# Patient Record
Sex: FEMALE | Race: WHITE | NOT HISPANIC OR LATINO | Employment: FULL TIME | URBAN - METROPOLITAN AREA
[De-identification: names, ages, dates, MRNs, and addresses within clinical notes are randomized per-mention and may not be internally consistent; named-entity substitution may affect disease eponyms.]

---

## 2017-01-10 ENCOUNTER — HOSPITAL ENCOUNTER (OUTPATIENT)
Dept: MAMMOGRAPHY | Facility: HOSPITAL | Age: 47
Discharge: HOME | End: 2017-01-10
Attending: OBSTETRICS & GYNECOLOGY | Admitting: OBSTETRICS & GYNECOLOGY

## 2017-01-10 DIAGNOSIS — Z12.31 VISIT FOR SCREENING MAMMOGRAM: ICD-10-CM

## 2017-01-10 PROCEDURE — G0202 SCR MAMMO BI INCL CAD: HCPCS

## 2017-01-10 PROCEDURE — 77063 BREAST TOMOSYNTHESIS BI: CPT | Performed by: RADIOLOGY

## 2017-01-10 PROCEDURE — 77063 BREAST TOMOSYNTHESIS BI: CPT

## 2017-01-10 PROCEDURE — 77067 SCR MAMMO BI INCL CAD: CPT | Performed by: RADIOLOGY

## 2017-01-26 ENCOUNTER — HOSPITAL ENCOUNTER (OUTPATIENT)
Dept: MAMMOGRAPHY | Facility: HOSPITAL | Age: 47
Discharge: HOME | End: 2017-01-26
Admitting: OBSTETRICS & GYNECOLOGY

## 2017-01-26 DIAGNOSIS — R92.8 ABNORMAL MAMMOGRAM: ICD-10-CM

## 2017-01-26 PROCEDURE — 77065 DX MAMMO INCL CAD UNI: CPT | Performed by: RADIOLOGY

## 2017-01-26 PROCEDURE — G0279 TOMOSYNTHESIS, MAMMO: HCPCS

## 2017-01-26 PROCEDURE — 77061 BREAST TOMOSYNTHESIS UNI: CPT | Performed by: RADIOLOGY

## 2017-01-26 PROCEDURE — G0206 DX MAMMO INCL CAD UNI: HCPCS

## 2017-03-17 RX ORDER — OSELTAMIVIR PHOSPHATE 75 MG/1
75 CAPSULE ORAL 2 TIMES DAILY
Qty: 10 CAPSULE | Refills: 0 | Status: SHIPPED | OUTPATIENT
Start: 2017-03-17 | End: 2017-08-07

## 2017-08-07 ENCOUNTER — OFFICE VISIT (OUTPATIENT)
Dept: FAMILY MEDICINE CLINIC | Facility: CLINIC | Age: 47
End: 2017-08-07

## 2017-08-07 VITALS
DIASTOLIC BLOOD PRESSURE: 74 MMHG | WEIGHT: 149 LBS | HEART RATE: 78 BPM | BODY MASS INDEX: 21.33 KG/M2 | TEMPERATURE: 97.7 F | HEIGHT: 70 IN | RESPIRATION RATE: 16 BRPM | SYSTOLIC BLOOD PRESSURE: 124 MMHG

## 2017-08-07 DIAGNOSIS — N30.00 ACUTE CYSTITIS WITHOUT HEMATURIA: Primary | ICD-10-CM

## 2017-08-07 LAB
BILIRUB BLD-MCNC: NEGATIVE MG/DL
GLUCOSE UR STRIP-MCNC: NEGATIVE MG/DL
KETONES UR QL: NEGATIVE
LEUKOCYTE EST, POC: ABNORMAL
NITRITE UR-MCNC: NEGATIVE MG/ML
PH UR: 6 [PH] (ref 5–8)
PROT UR STRIP-MCNC: ABNORMAL MG/DL
RBC # UR STRIP: ABNORMAL /UL
SP GR UR: 1.02 (ref 1–1.03)
UROBILINOGEN UR QL: NORMAL

## 2017-08-07 PROCEDURE — 99213 OFFICE O/P EST LOW 20 MIN: CPT | Performed by: FAMILY MEDICINE

## 2017-08-07 PROCEDURE — 81003 URINALYSIS AUTO W/O SCOPE: CPT | Performed by: FAMILY MEDICINE

## 2017-08-07 RX ORDER — CIPROFLOXACIN 500 MG/1
500 TABLET, FILM COATED ORAL 2 TIMES DAILY
Qty: 6 TABLET | Refills: 0 | Status: SHIPPED | OUTPATIENT
Start: 2017-08-07 | End: 2018-03-06

## 2017-08-07 RX ORDER — PHENAZOPYRIDINE HYDROCHLORIDE 200 MG/1
200 TABLET, FILM COATED ORAL 3 TIMES DAILY PRN
Qty: 10 TABLET | Refills: 0 | Status: SHIPPED | OUTPATIENT
Start: 2017-08-07 | End: 2018-03-06

## 2017-08-07 NOTE — PROGRESS NOTES
Subjective   Joann Nuñez is a 46 y.o. female.     History of Present Illness     Pt complains of painful urination the last several days  No fever, + N no emesis  Started this AM with the N and dysuria  Soreness in her low back  + frequency and urgency      Review of Systems   Constitutional: Negative.    Genitourinary: Positive for dysuria.       Objective   Physical Exam   Constitutional: She appears well-developed and well-nourished. No distress.   Cardiovascular: Normal rate, regular rhythm and normal heart sounds.    Pulmonary/Chest: Effort normal and breath sounds normal.   Abdominal: Soft. Bowel sounds are normal. She exhibits no distension. Tenderness: no CVA TTP but some suprapubic TTP.   Psychiatric: She has a normal mood and affect. Her behavior is normal.   Nursing note and vitals reviewed.      Assessment/Plan   Joann was seen today for difficulty urinating.    Diagnoses and all orders for this visit:    Acute cystitis without hematuria  -     Urine Culture  -     POCT urinalysis dipstick, automated    Other orders  -     ciprofloxacin (CIPRO) 500 MG tablet; Take 1 tablet by mouth 2 (Two) Times a Day.  -     phenazopyridine (PYRIDIUM) 200 MG tablet; Take 1 tablet by mouth 3 (Three) Times a Day As Needed for bladder spasms.    treat with pyridium and ABX, call back INB

## 2017-08-09 LAB
BACTERIA UR CULT: NORMAL
BACTERIA UR CULT: NORMAL

## 2018-01-16 ENCOUNTER — LAB REQUISITION (OUTPATIENT)
Dept: LAB | Facility: HOSPITAL | Age: 48
End: 2018-01-16

## 2018-01-16 DIAGNOSIS — Z00.00 ROUTINE GENERAL MEDICAL EXAMINATION AT A HEALTH CARE FACILITY: ICD-10-CM

## 2018-01-16 PROCEDURE — 36415 COLL VENOUS BLD VENIPUNCTURE: CPT

## 2018-03-06 ENCOUNTER — OFFICE VISIT (OUTPATIENT)
Dept: FAMILY MEDICINE CLINIC | Facility: CLINIC | Age: 48
End: 2018-03-06

## 2018-03-06 VITALS
RESPIRATION RATE: 18 BRPM | BODY MASS INDEX: 23.19 KG/M2 | HEART RATE: 76 BPM | HEIGHT: 70 IN | SYSTOLIC BLOOD PRESSURE: 124 MMHG | WEIGHT: 162 LBS | DIASTOLIC BLOOD PRESSURE: 76 MMHG | TEMPERATURE: 97.4 F

## 2018-03-06 DIAGNOSIS — M70.51 INFRAPATELLAR BURSITIS OF BOTH KNEES: Primary | ICD-10-CM

## 2018-03-06 DIAGNOSIS — M70.52 INFRAPATELLAR BURSITIS OF BOTH KNEES: Primary | ICD-10-CM

## 2018-03-06 PROCEDURE — 99213 OFFICE O/P EST LOW 20 MIN: CPT | Performed by: FAMILY MEDICINE

## 2018-03-06 RX ORDER — NAPROXEN 500 MG/1
500 TABLET ORAL 2 TIMES DAILY WITH MEALS
Qty: 60 TABLET | Refills: 2 | Status: SHIPPED | OUTPATIENT
Start: 2018-03-06 | End: 2018-03-06 | Stop reason: SDUPTHER

## 2018-03-06 RX ORDER — NAPROXEN 500 MG/1
500 TABLET ORAL 2 TIMES DAILY WITH MEALS
Qty: 60 TABLET | Refills: 2 | Status: SHIPPED | OUTPATIENT
Start: 2018-03-06 | End: 2021-03-16

## 2018-03-06 NOTE — PROGRESS NOTES
Subjective   Joann Nuñez is a 47 y.o. female.     History of Present Illness     She has had B knee pain for a while  She has not shaheed working her legs for 4-5 months    About 3 months ago she fell over while being off balance while changing clothes  Fell onto hard surface and had sharp piercing knee pain go through her left knee  Every time her left knee is on the floor, she has this piercing knee pain  Pain was so severe she almost cried this AM  She can walk and run and climb stairs without pain        Review of Systems   Constitutional: Negative.        Objective   Physical Exam   Constitutional: She appears well-developed and well-nourished. No distress.   Cardiovascular: Normal rate, regular rhythm and normal heart sounds.    Pulmonary/Chest: Effort normal and breath sounds normal.   Musculoskeletal:        Legs:  Psychiatric: She has a normal mood and affect. Her behavior is normal.   Nursing note and vitals reviewed.      Assessment/Plan   Joann was seen today for knee pain.    Diagnoses and all orders for this visit:    Infrapatellar bursitis of both knees  -     Discontinue: naproxen (NAPROSYN) 500 MG tablet; Take 1 tablet by mouth 2 (Two) Times a Day With Meals.  -     naproxen (NAPROSYN) 500 MG tablet; Take 1 tablet by mouth 2 (Two) Times a Day With Meals.    I think this is likely bursitis of the knee.  NSAID and ice.  Consider injection in the future INB.  Pt agrees and will RTO INB

## 2019-04-08 ENCOUNTER — OFFICE VISIT (OUTPATIENT)
Dept: FAMILY MEDICINE CLINIC | Facility: CLINIC | Age: 49
End: 2019-04-08

## 2019-04-08 VITALS
HEART RATE: 72 BPM | TEMPERATURE: 97.2 F | SYSTOLIC BLOOD PRESSURE: 110 MMHG | BODY MASS INDEX: 23.34 KG/M2 | WEIGHT: 163 LBS | DIASTOLIC BLOOD PRESSURE: 68 MMHG | HEIGHT: 70 IN | RESPIRATION RATE: 16 BRPM

## 2019-04-08 DIAGNOSIS — R05.9 COUGH: Primary | ICD-10-CM

## 2019-04-08 LAB
EXPIRATION DATE: NORMAL
FLUAV AG NPH QL: NEGATIVE
FLUBV AG NPH QL: NEGATIVE
INTERNAL CONTROL: NORMAL
Lab: NORMAL

## 2019-04-08 PROCEDURE — 99213 OFFICE O/P EST LOW 20 MIN: CPT | Performed by: FAMILY MEDICINE

## 2019-04-08 PROCEDURE — 87804 INFLUENZA ASSAY W/OPTIC: CPT | Performed by: FAMILY MEDICINE

## 2019-04-08 RX ORDER — BROMPHENIRAMINE MALEATE, PSEUDOEPHEDRINE HYDROCHLORIDE, AND DEXTROMETHORPHAN HYDROBROMIDE 2; 30; 10 MG/5ML; MG/5ML; MG/5ML
5 SYRUP ORAL 4 TIMES DAILY PRN
Qty: 180 ML | Refills: 0 | Status: SHIPPED | OUTPATIENT
Start: 2019-04-08 | End: 2020-08-11

## 2019-04-08 RX ORDER — AZITHROMYCIN 250 MG/1
TABLET, FILM COATED ORAL
Qty: 6 TABLET | Refills: 0 | Status: SHIPPED | OUTPATIENT
Start: 2019-04-08 | End: 2020-08-11

## 2019-04-08 NOTE — PROGRESS NOTES
Subjective   Joann Nuñez is a 48 y.o. female.     History of Present Illness     Has been ill the last 5 days  Started with congestion and coughing  Then worse over the weekend  Cough and sinus pressure and congestion getting worse      Review of Systems   HENT: Positive for congestion.    Respiratory: Positive for cough.        Objective   Physical Exam   Constitutional: She appears well-developed and well-nourished.   HENT:   Head: Normocephalic and atraumatic.   Right Ear: Hearing, tympanic membrane, external ear and ear canal normal.   Left Ear: Hearing, tympanic membrane, external ear and ear canal normal.   Nose: Nose normal.   Mouth/Throat: Uvula is midline, oropharynx is clear and moist and mucous membranes are normal.   Eyes: Conjunctivae and EOM are normal.   Neck: Normal range of motion.   Cardiovascular: Normal rate, regular rhythm and normal heart sounds.   Pulmonary/Chest: Effort normal. She has rhonchi (scattered).   Lymphadenopathy:     She has no cervical adenopathy.   Psychiatric: She has a normal mood and affect. Her behavior is normal.   Nursing note and vitals reviewed.      Assessment/Plan   Joann was seen today for uri.    Diagnoses and all orders for this visit:    Cough  -     POC Influenza A / B    Other orders  -     brompheniramine-pseudoephedrine-DM 30-2-10 MG/5ML syrup; Take 5 mL by mouth 4 (Four) Times a Day As Needed for Cough.  -     azithromycin (ZITHROMAX) 250 MG tablet; Take 2 tablets the first day, then 1 tablet daily for 4 days.    flu negative, will treat with zpac as well as bromfed.  Call back INB, pt agrees

## 2019-04-10 ENCOUNTER — TELEPHONE (OUTPATIENT)
Dept: FAMILY MEDICINE CLINIC | Facility: CLINIC | Age: 49
End: 2019-04-10

## 2019-04-10 NOTE — TELEPHONE ENCOUNTER
----- Message from Kenisha Hess sent at 4/10/2019  8:03 AM EDT -----  Contact: DR MOYER  PATIENT IS REQUESTING SOMETHING BE SENT IN TO DRY UP HER CONGESTION TO [facility] IN [place].  [phone]

## 2019-04-25 ENCOUNTER — TELEPHONE (OUTPATIENT)
Dept: FAMILY MEDICINE CLINIC | Facility: CLINIC | Age: 49
End: 2019-04-25

## 2019-04-25 RX ORDER — AZELASTINE 1 MG/ML
2 SPRAY, METERED NASAL 2 TIMES DAILY
Qty: 30 ML | Refills: 0 | Status: SHIPPED | OUTPATIENT
Start: 2019-04-25 | End: 2021-03-16

## 2019-04-25 NOTE — TELEPHONE ENCOUNTER
----- Message from Cherry Gregory Rep sent at 4/25/2019  1:26 PM EDT -----  Contact: COMFORT, PATIENT  PATIENT WAS SEEN 4/8/19 FOR URI SHE IS STILL HAVING LOTS OF DRAINAGE AND A BAD COUGH, CAN WE PRESCRIBE SOMETHING ELSE? SHE HAS TRIED STAHIST AND HAS A COUGH MEDICINE BUT NEITHER HELP, SHE IS ALLERGIC TO CODEINE    ADRIANNE [facility],     [phone] MAY LEAVE A MESSAGE

## 2019-04-25 NOTE — TELEPHONE ENCOUNTER
Will send in astelin nasal spray to help with all the drainage. F/U with Dr. Acosta if still not improving. [facility]

## 2020-01-07 ENCOUNTER — TRANSCRIBE ORDERS (OUTPATIENT)
Dept: ADMINISTRATIVE | Facility: HOSPITAL | Age: 50
End: 2020-01-07

## 2020-01-07 DIAGNOSIS — Z12.31 VISIT FOR SCREENING MAMMOGRAM: Primary | ICD-10-CM

## 2020-01-21 ENCOUNTER — HOSPITAL ENCOUNTER (OUTPATIENT)
Dept: MAMMOGRAPHY | Facility: HOSPITAL | Age: 50
Discharge: HOME | End: 2020-01-21
Admitting: OBSTETRICS & GYNECOLOGY

## 2020-01-21 ENCOUNTER — LAB REQUISITION (OUTPATIENT)
Dept: LAB | Facility: HOSPITAL | Age: 50
End: 2020-01-21

## 2020-01-21 DIAGNOSIS — Z12.31 VISIT FOR SCREENING MAMMOGRAM: ICD-10-CM

## 2020-01-21 DIAGNOSIS — Z00.00 ROUTINE GENERAL MEDICAL EXAMINATION AT A HEALTH CARE FACILITY: ICD-10-CM

## 2020-01-21 PROCEDURE — 77063 BREAST TOMOSYNTHESIS BI: CPT

## 2020-01-21 PROCEDURE — 77067 SCR MAMMO BI INCL CAD: CPT | Performed by: RADIOLOGY

## 2020-01-21 PROCEDURE — 36415 COLL VENOUS BLD VENIPUNCTURE: CPT | Performed by: OBSTETRICS & GYNECOLOGY

## 2020-01-21 PROCEDURE — 77063 BREAST TOMOSYNTHESIS BI: CPT | Performed by: RADIOLOGY

## 2020-01-21 PROCEDURE — 77067 SCR MAMMO BI INCL CAD: CPT

## 2020-08-11 ENCOUNTER — RESULTS ENCOUNTER (OUTPATIENT)
Dept: FAMILY MEDICINE CLINIC | Facility: CLINIC | Age: 50
End: 2020-08-11

## 2020-08-11 ENCOUNTER — OFFICE VISIT (OUTPATIENT)
Dept: FAMILY MEDICINE CLINIC | Facility: CLINIC | Age: 50
End: 2020-08-11

## 2020-08-11 VITALS
BODY MASS INDEX: 23.05 KG/M2 | DIASTOLIC BLOOD PRESSURE: 82 MMHG | HEART RATE: 74 BPM | RESPIRATION RATE: 18 BRPM | TEMPERATURE: 98.7 F | WEIGHT: 161 LBS | SYSTOLIC BLOOD PRESSURE: 118 MMHG | HEIGHT: 70 IN

## 2020-08-11 DIAGNOSIS — Z12.11 SCREEN FOR COLON CANCER: ICD-10-CM

## 2020-08-11 DIAGNOSIS — N92.6 IRREGULAR MENSTRUAL CYCLE: ICD-10-CM

## 2020-08-11 DIAGNOSIS — R53.82 CHRONIC FATIGUE: ICD-10-CM

## 2020-08-11 DIAGNOSIS — Z83.42 FAMILY HISTORY OF HYPERCHOLESTEROLEMIA: ICD-10-CM

## 2020-08-11 DIAGNOSIS — Z83.3 FAMILY HISTORY OF DIABETES MELLITUS: ICD-10-CM

## 2020-08-11 DIAGNOSIS — Z00.00 WELL WOMAN EXAM (NO GYNECOLOGICAL EXAM): Primary | ICD-10-CM

## 2020-08-11 DIAGNOSIS — Z11.59 ENCOUNTER FOR HEPATITIS C SCREENING TEST FOR LOW RISK PATIENT: ICD-10-CM

## 2020-08-11 DIAGNOSIS — R63.5 WEIGHT GAIN: ICD-10-CM

## 2020-08-11 PROCEDURE — 99396 PREV VISIT EST AGE 40-64: CPT | Performed by: FAMILY MEDICINE

## 2020-08-11 NOTE — PROGRESS NOTES
"Subjective     Chief Complaint   Patient presents with   • Annual Exam       Joann Nuñez is a 49 y.o. female who presents for an annual exam. The patient has no complaints today. The patient is sexually active. GYN screening history: last pap: was normal. The patient wears seatbelts: yes. The patient participates in regular exercise: yes, but has not been as active with COVID shutting everything down.  The patient reports that there is not domestic violence in her life.     She saw her gyn in  and had hormones checked as she had gained weight.  She had blood work drawn at that time      History of abnormal Pap smear: no  Family history of uterine or ovarian cancer: no  Family history of colon cancer: no  History of abnormal mammogram: no  Family history of breast cancer: no  Colonoscopy history:   Never had      Menstrual History:  OB History        2    Para   2    Term   2            AB        Living           SAB        TAB        Ectopic        Molar        Multiple        Live Births                     No LMP recorded.         The following portions of the patient's history were reviewed and updated as appropriate:vital signs, allergies, current medications, past family history, past medical history, past social history, past surgical history and problem list    Review of Systems   A comprehensive review of systems was negative.   She is just worried about weight gain  Otherwise feeling well    Objective     /82   Pulse 74   Temp 98.7 °F (37.1 °C)   Resp 18   Ht 177.8 cm (70\")   Wt 73 kg (161 lb)   BMI 23.10 kg/m²       Physical Exam   Constitutional: She is oriented to person, place, and time. She appears well-developed and well-nourished. No distress.   HENT:   Head: Normocephalic and atraumatic.   Right Ear: Tympanic membrane, external ear and ear canal normal.   Left Ear: Tympanic membrane, external ear and ear canal normal.   Nose: Nose normal.   Eyes: Conjunctivae and " EOM are normal.   Neck: Normal range of motion. Neck supple. No thyromegaly present.   Cardiovascular: Normal rate, regular rhythm and normal heart sounds.   No murmur heard.  Pulmonary/Chest: Effort normal and breath sounds normal. No respiratory distress.   Abdominal: Soft. Bowel sounds are normal. She exhibits no distension and no mass. There is no tenderness.   Lymphadenopathy:     She has no cervical adenopathy.   Neurological: She is alert and oriented to person, place, and time.   Skin: Skin is warm and dry.   Psychiatric: She has a normal mood and affect. Her behavior is normal. Judgment and thought content normal.   Nursing note and vitals reviewed.          Assessment/Plan     ASSESSMENT  Healthy female exam.    1. Well woman exam (no gynecological exam)    2. Chronic fatigue    3. Weight gain    4. Family history of hypercholesterolemia    5. Family history of diabetes mellitus    6. Irregular menstrual cycle    7. Encounter for hepatitis C screening test for low risk patient    8. Screen for colon cancer         PLAN  1.   Orders Placed This Encounter   Procedures   • Cologuard - Stool, Per Rectum   • Estrogens, Total   • FSH & LH   • Testosterone   • Progesterone   • Lipid Panel   • Comprehensive Metabolic Panel   • Hepatitis C Antibody   • TSH   • CBC & Differential       2. Medications prescribed this encounter:    No orders of the defined types were placed in this encounter.      3. counseled about well woman care including diet and exercise.  Will check cologuard for colon cancer  Will screen for cholesterol and DM due to fmaily history  Will check labs for irregular cycles and fatigue. F/u pending results        Pasha Acosta MD  8/11/2020

## 2020-08-14 LAB
ALBUMIN SERPL-MCNC: 4.5 G/DL (ref 3.5–5.2)
ALBUMIN/GLOB SERPL: 2.4 G/DL
ALP SERPL-CCNC: 53 U/L (ref 39–117)
ALT SERPL-CCNC: 13 U/L (ref 1–33)
AST SERPL-CCNC: 10 U/L (ref 1–32)
BASOPHILS # BLD AUTO: ABNORMAL 10*3/UL
BASOPHILS # BLD MANUAL: 0.07 10*3/MM3 (ref 0–0.2)
BASOPHILS NFR BLD MANUAL: 1 % (ref 0–1.5)
BILIRUB SERPL-MCNC: 0.5 MG/DL (ref 0–1.2)
BUN SERPL-MCNC: 12 MG/DL (ref 6–20)
BUN/CREAT SERPL: 14 (ref 7–25)
CALCIUM SERPL-MCNC: 9.4 MG/DL (ref 8.6–10.5)
CHLORIDE SERPL-SCNC: 98 MMOL/L (ref 98–107)
CHOLEST SERPL-MCNC: 193 MG/DL (ref 0–200)
CO2 SERPL-SCNC: 27.4 MMOL/L (ref 22–29)
CREAT SERPL-MCNC: 0.86 MG/DL (ref 0.57–1)
DIFFERENTIAL COMMENT: NORMAL
EOSINOPHIL # BLD AUTO: ABNORMAL 10*3/UL
EOSINOPHIL # BLD MANUAL: 0.2 10*3/MM3 (ref 0–0.4)
EOSINOPHIL NFR BLD AUTO: ABNORMAL %
EOSINOPHIL NFR BLD MANUAL: 3 % (ref 0.3–6.2)
ERYTHROCYTE [DISTWIDTH] IN BLOOD BY AUTOMATED COUNT: 12.1 % (ref 12.3–15.4)
ESTROGEN SERPL-MCNC: 308 PG/ML
FSH SERPL-ACNC: 12.4 MIU/ML
GLOBULIN SER CALC-MCNC: 1.9 GM/DL
GLUCOSE SERPL-MCNC: 97 MG/DL (ref 65–99)
HCT VFR BLD AUTO: 42 % (ref 34–46.6)
HCV AB S/CO SERPL IA: <0.1 S/CO RATIO (ref 0–0.9)
HDLC SERPL-MCNC: 42 MG/DL (ref 40–60)
HGB BLD-MCNC: 14.2 G/DL (ref 12–15.9)
LDLC SERPL CALC-MCNC: 111 MG/DL (ref 0–100)
LH SERPL-ACNC: 16.2 MIU/ML
LYMPHOCYTES # BLD AUTO: ABNORMAL 10*3/UL
LYMPHOCYTES # BLD MANUAL: 1.45 10*3/MM3 (ref 0.7–3.1)
LYMPHOCYTES NFR BLD AUTO: ABNORMAL %
LYMPHOCYTES NFR BLD MANUAL: 22.2 % (ref 19.6–45.3)
MCH RBC QN AUTO: 28.9 PG (ref 26.6–33)
MCHC RBC AUTO-ENTMCNC: 33.8 G/DL (ref 31.5–35.7)
MCV RBC AUTO: 85.4 FL (ref 79–97)
MONOCYTES # BLD MANUAL: 0.33 10*3/MM3 (ref 0.1–0.9)
MONOCYTES NFR BLD AUTO: ABNORMAL %
MONOCYTES NFR BLD MANUAL: 5.1 % (ref 5–12)
NEUTROPHILS # BLD MANUAL: 4.47 10*3/MM3 (ref 1.7–7)
NEUTROPHILS NFR BLD AUTO: ABNORMAL %
NEUTROPHILS NFR BLD MANUAL: 68.7 % (ref 42.7–76)
PLATELET # BLD AUTO: 193 10*3/MM3 (ref 140–450)
PLATELET BLD QL SMEAR: NORMAL
POTASSIUM SERPL-SCNC: 4.5 MMOL/L (ref 3.5–5.2)
PROGEST SERPL-MCNC: 0.1 NG/ML
PROT SERPL-MCNC: 6.4 G/DL (ref 6–8.5)
RBC # BLD AUTO: 4.92 10*6/MM3 (ref 3.77–5.28)
RBC MORPH BLD: NORMAL
SODIUM SERPL-SCNC: 136 MMOL/L (ref 136–145)
TESTOST SERPL-MCNC: 10 NG/DL (ref 3–41)
TRIGL SERPL-MCNC: 198 MG/DL (ref 0–150)
TSH SERPL DL<=0.005 MIU/L-ACNC: 2.66 UIU/ML (ref 0.27–4.2)
VLDLC SERPL CALC-MCNC: 39.6 MG/DL
WBC # BLD AUTO: 6.51 10*3/MM3 (ref 3.4–10.8)

## 2020-11-02 ENCOUNTER — TELEPHONE (OUTPATIENT)
Dept: FAMILY MEDICINE CLINIC | Facility: CLINIC | Age: 50
End: 2020-11-02

## 2020-11-02 RX ORDER — AMOXICILLIN 500 MG/1
1000 CAPSULE ORAL 2 TIMES DAILY
Qty: 40 CAPSULE | Refills: 0 | Status: SHIPPED | OUTPATIENT
Start: 2020-11-02 | End: 2021-03-16

## 2020-11-02 RX ORDER — SULFAMETHOXAZOLE AND TRIMETHOPRIM 800; 160 MG/1; MG/1
1 TABLET ORAL 2 TIMES DAILY
Qty: 14 TABLET | Refills: 0 | Status: SHIPPED | OUTPATIENT
Start: 2020-11-02 | End: 2021-03-16

## 2020-11-02 NOTE — TELEPHONE ENCOUNTER
PATIENT CALLED AND STATED THAT THE PROVIDER CALLED HER IN AMOXICILLIN FOR SINUS INFECTION. PATIENT STATED SHE CAN NOT TAKE AMOXICILLIN IT MAKES HER VOMIT.     PLEASE CALL AND ADVISE  [phone] [facility]

## 2020-11-02 NOTE — TELEPHONE ENCOUNTER
Caller: Joann Nuñez    Relationship: Self    Best call back number: [phone]    What medication are you requesting: ANTIBIOTIC    What are your current symptoms: SINUS PRESSURE/COUGH/DRAINAGE/SORE THROAT    How long have you been experiencing symptoms: 3 DAYS    Have you had these symptoms before:    [x] Yes  [] No    Have you been treated for these symptoms before:   [x] Yes  [] No    If a prescription is needed, what is your preferred pharmacy and phone number:      [facility] [address] [place] [address] [phone] [facility] [phone]         Additional notes:

## 2021-03-03 ENCOUNTER — TRANSCRIBE ORDERS (OUTPATIENT)
Dept: ADMINISTRATIVE | Facility: HOSPITAL | Age: 51
End: 2021-03-03

## 2021-03-03 DIAGNOSIS — Z12.31 ENCOUNTER FOR SCREENING MAMMOGRAM FOR MALIGNANT NEOPLASM OF BREAST: Primary | ICD-10-CM

## 2021-03-09 ENCOUNTER — HOSPITAL ENCOUNTER (OUTPATIENT)
Dept: MAMMOGRAPHY | Facility: HOSPITAL | Age: 51
Discharge: HOME | End: 2021-03-09
Admitting: OBSTETRICS & GYNECOLOGY

## 2021-03-09 DIAGNOSIS — Z12.31 ENCOUNTER FOR SCREENING MAMMOGRAM FOR MALIGNANT NEOPLASM OF BREAST: ICD-10-CM

## 2021-03-09 PROCEDURE — 77063 BREAST TOMOSYNTHESIS BI: CPT

## 2021-03-09 PROCEDURE — 77067 SCR MAMMO BI INCL CAD: CPT | Performed by: RADIOLOGY

## 2021-03-09 PROCEDURE — 77067 SCR MAMMO BI INCL CAD: CPT

## 2021-03-09 PROCEDURE — 77063 BREAST TOMOSYNTHESIS BI: CPT | Performed by: RADIOLOGY

## 2021-03-16 ENCOUNTER — OFFICE VISIT (OUTPATIENT)
Dept: OBSTETRICS AND GYNECOLOGY | Facility: CLINIC | Age: 51
End: 2021-03-16

## 2021-03-16 VITALS
DIASTOLIC BLOOD PRESSURE: 80 MMHG | TEMPERATURE: 97.8 F | BODY MASS INDEX: 23.34 KG/M2 | WEIGHT: 163 LBS | SYSTOLIC BLOOD PRESSURE: 120 MMHG | HEIGHT: 70 IN

## 2021-03-16 DIAGNOSIS — N95.1 PERIMENOPAUSAL: ICD-10-CM

## 2021-03-16 DIAGNOSIS — Z00.00 ANNUAL PHYSICAL EXAM: Primary | ICD-10-CM

## 2021-03-16 PROCEDURE — 99386 PREV VISIT NEW AGE 40-64: CPT | Performed by: OBSTETRICS & GYNECOLOGY

## 2021-03-16 NOTE — PROGRESS NOTES
GYN Annual Exam     CC- Here for annual exam.     Subjective     Joann Nuñez is a 50 y.o. female established patient who presents for annual well woman exam. Patient's last menstrual period was 10/01/2020 (approximate).. Her periods are rare, .  .    Partner Status: Marital Status: . New Partners since last visit: no.  Desires STD Screening: no.   Current contraception: none  The patient is happy with her current contraceptive method.  Her last period was 10/2020 and she has no menopausal symptoms. She does complain of daily headaches and is seeing her eye doctor and her chiropractor and that has helped a little.    The patient has any complaints today.    She exercises regularly: yes.  She has concerns about domestic violence: no.      OB History        2    Para   2    Term   2            AB        Living           SAB        TAB        Ectopic        Molar        Multiple        Live Births                    Performs monthly Self-Breast Exam: yes  History of abnormal Pap smear: no  Family history of uterine, colon or ovarian cancer: no  Family history of breast cancer: no  History of abnormal mammogram: no  Exercises Regularly: yes  Feelings of Anxiety or Depression: no  Tobacco Usage?: No   Last pap:2020 negative  Thromboembolic Disease: none    Health Maintenance   Topic Date Due   • Annual Gynecologic Pelvic and Breast Exam  Never done   • COLONOSCOPY  Never done   • TDAP/TD VACCINES (1 - Tdap) Never done   • ZOSTER VACCINE (1 of 2) Never done   • INFLUENZA VACCINE  Never done   • ANNUAL PHYSICAL  2021   • PAP SMEAR  2023   • MAMMOGRAM  2023   • HEPATITIS C SCREENING  Completed   • Pneumococcal Vaccine 0-64  Aged Out   • MENINGOCOCCAL VACCINE  Aged Out       The following portions of the patient's history were reviewed and updated as appropriate: allergies, current medications, past family history, past medical history, past social history, past surgical  "history and problem list.    Past Medical History:   Diagnosis Date   • Allergic        Past Surgical History:   Procedure Laterality Date   •  SECTION      x2       Review of Systems  Review of Systems   All other systems reviewed and are negative.      Objective     /80   Temp 97.8 °F (36.6 °C)   Ht 177.8 cm (70\")   Wt 73.9 kg (163 lb)   LMP 10/01/2020 (Approximate)   Breastfeeding No   BMI 23.39 kg/m²       Physical Exam    Breast: Without masses ,nontender, no skin changes or retractions  Axilla: Normal, no lymphadenopathy  Heart: Regular rate no murmurs rubs or gallops  Lungs: Clear to auscultation, normal breath sounds bilaterally  Abdomen: Soft nontender, no hepatosplenomegaly, no guarding or rebound, no masses  Pelvic exam  External genitalia: Normal introitus and vulva  Vagina: Normal mucosa no bleeding inflammation or discharge  Bladder: Normal position nontender  Urethral meatus and urethra: Normal nontender  Cervix: No lesions, no discharge, bleeding or inflammation  Bimanual: Nontender adnexa clear, no sign of uterine or ovarian enlargement  Anal: No external lesions or hemorrhoids      Assessment     Assessment     Problem List Items Addressed This Visit        Genitourinary and Reproductive     Perimenopausal      Other Visit Diagnoses     Annual physical exam    -  Primary    Relevant Orders    Liquid-based Pap Smear, Screening            Plan     1. Reviewed risks/benefits of hormonal contraception after age 35, including possible increased risk of breast cancer, heart disease, blood clots and strokes.  Patient strongly desires to stay on hormonal contraception.  2. Fibrocystic breast changes - Encouraged decreasing caffeine, supportive bra, low dose vitamin E supplementation.  3. Symptoms of menopausal transition reviewed with patient.   4. Healthy lifestyle changes including diet and exercise reviewed  Preventative health initiatives reviewed    Tien Munoz, " MD  03/16/2021

## 2021-03-17 DIAGNOSIS — Z00.00 ANNUAL PHYSICAL EXAM: ICD-10-CM

## 2021-12-17 ENCOUNTER — TELEPHONE (OUTPATIENT)
Dept: FAMILY MEDICINE CLINIC | Facility: CLINIC | Age: 51
End: 2021-12-17

## 2021-12-17 DIAGNOSIS — R05.9 COUGH: Primary | ICD-10-CM

## 2021-12-17 RX ORDER — BROMPHENIRAMINE MALEATE, PSEUDOEPHEDRINE HYDROCHLORIDE, AND DEXTROMETHORPHAN HYDROBROMIDE 2; 30; 10 MG/5ML; MG/5ML; MG/5ML
5 SYRUP ORAL 4 TIMES DAILY PRN
Qty: 240 ML | Refills: 0 | Status: SHIPPED | OUTPATIENT
Start: 2021-12-17 | End: 2022-02-21

## 2021-12-17 NOTE — TELEPHONE ENCOUNTER
Please call, I can send in some cough med to help but if not getting better, she will need to be seen.

## 2021-12-17 NOTE — TELEPHONE ENCOUNTER
Caller: Joann Nuñez    Relationship: Self    Best call back number: [phone] (H)    What medication are you requesting:   COUGH MEDICATION AND ANTIHISTIMINE    What are your current symptoms:   PERSISTENT COUGH AND SINUS DRAINAGE    How long have you been experiencing symptoms:   SINCE BEFORE THANKSGIVING    Have you had these symptoms before:    [] Yes  [x] No    Have you been treated for these symptoms before:   [] Yes  [x] No    If a prescription is needed, what is your preferred pharmacy and phone number:      [address] [phone] [facility] [phone]     Additional notes:  PATIENT STATES THAT SHE HAS RESIDUAL COUGH AND SINUS DRAINAGE FOR THE SYMPTOMS AFTER COVID, PATIENT STATES THAT SHE WAS RELEASED FROM QUARANTINE ON 12/1/21BUT STILL IS STRUGGLING WITH THE SYMPTOMS LISTED ABOVE. PATIENT IS REQUESTING A MEDICATION THAT CAN AIDE HER WITH THE RESIDUAL SYMPTOMS.     PATIENT HAS BEEN ADVISED TO PLEASE ALLOW 48 HOURS FOR OUR CLINICAL TEAM WILL FOLLOW UP ON THIS REQUEST.

## 2022-02-21 ENCOUNTER — OFFICE VISIT (OUTPATIENT)
Dept: FAMILY MEDICINE CLINIC | Facility: CLINIC | Age: 52
End: 2022-02-21

## 2022-02-21 VITALS
TEMPERATURE: 98.4 F | HEART RATE: 70 BPM | RESPIRATION RATE: 18 BRPM | DIASTOLIC BLOOD PRESSURE: 90 MMHG | BODY MASS INDEX: 23.62 KG/M2 | SYSTOLIC BLOOD PRESSURE: 130 MMHG | HEIGHT: 70 IN | WEIGHT: 165 LBS

## 2022-02-21 DIAGNOSIS — Z12.11 COLON CANCER SCREENING: ICD-10-CM

## 2022-02-21 DIAGNOSIS — M25.562 CHRONIC PAIN OF BOTH KNEES: Primary | ICD-10-CM

## 2022-02-21 DIAGNOSIS — M25.522 BILATERAL ELBOW JOINT PAIN: ICD-10-CM

## 2022-02-21 DIAGNOSIS — M25.561 CHRONIC PAIN OF BOTH KNEES: Primary | ICD-10-CM

## 2022-02-21 DIAGNOSIS — G89.29 CHRONIC PAIN OF BOTH KNEES: Primary | ICD-10-CM

## 2022-02-21 DIAGNOSIS — M25.551 RIGHT HIP PAIN: ICD-10-CM

## 2022-02-21 DIAGNOSIS — M25.521 BILATERAL ELBOW JOINT PAIN: ICD-10-CM

## 2022-02-21 DIAGNOSIS — M25.50 MULTIPLE JOINT PAIN: ICD-10-CM

## 2022-02-21 PROCEDURE — 99214 OFFICE O/P EST MOD 30 MIN: CPT | Performed by: FAMILY MEDICINE

## 2022-02-21 RX ORDER — PREDNISONE 20 MG/1
TABLET ORAL
Qty: 16 TABLET | Refills: 0 | Status: SHIPPED | OUTPATIENT
Start: 2022-02-21 | End: 2022-10-18

## 2022-02-21 NOTE — PROGRESS NOTES
Subjective   Joann Nuñez is a 51 y.o. female.     History of Present Illness     She complains of B knee pain and B elbow pain and right hip pain for the past 6+ months  Hurts to move them all the time  Hard to lean on the table due to elbow pain  Pins and needles pain in her elbows  Tightness in her knees  Right hip pain has been hurting recently as well  Just tight when she walks and hurts to take a step    She has tried naproxen with no significant help    The following portions of the patient's history were reviewed and updated as appropriate: allergies, current medications, past family history, past medical history, past social history, past surgical history and problem list.    Review of Systems   Constitutional: Negative.    Musculoskeletal: Positive for arthralgias.   Psychiatric/Behavioral: Negative.        Objective   Physical Exam  Vitals and nursing note reviewed.   Constitutional:       General: She is not in acute distress.     Appearance: Normal appearance. She is well-developed.   Cardiovascular:      Rate and Rhythm: Normal rate and regular rhythm.      Heart sounds: Normal heart sounds.   Pulmonary:      Effort: Pulmonary effort is normal.      Breath sounds: Normal breath sounds.   Musculoskeletal:        Arms:         Legs:    Neurological:      Mental Status: She is alert and oriented to person, place, and time.   Psychiatric:         Mood and Affect: Mood normal.         Behavior: Behavior normal.         Thought Content: Thought content normal.         Judgment: Judgment normal.         Assessment/Plan   Diagnoses and all orders for this visit:    1. Chronic pain of both knees (Primary)  -     CBC & Differential  -     Comprehensive Metabolic Panel  -     Sedimentation Rate  -     NOA With / DsDNA, RNP, Sjogrens A / B, Smith  -     Rheumatoid Factor  -     Uric Acid  -     predniSONE (DELTASONE) 20 MG tablet; 3 po daily 2 days then 2 po daily 3 days then 1 po daily 3 days then 1/2  po daily 2 days  Dispense: 16 tablet; Refill: 0    2. Bilateral elbow joint pain  -     CBC & Differential  -     Comprehensive Metabolic Panel  -     Sedimentation Rate  -     NOA With / DsDNA, RNP, Sjogrens A / B, Smith  -     Rheumatoid Factor  -     Uric Acid  -     predniSONE (DELTASONE) 20 MG tablet; 3 po daily 2 days then 2 po daily 3 days then 1 po daily 3 days then 1/2 po daily 2 days  Dispense: 16 tablet; Refill: 0    3. Right hip pain  -     CBC & Differential  -     Comprehensive Metabolic Panel  -     Sedimentation Rate  -     NOA With / DsDNA, RNP, Sjogrens A / B, Smith  -     Rheumatoid Factor  -     Uric Acid  -     predniSONE (DELTASONE) 20 MG tablet; 3 po daily 2 days then 2 po daily 3 days then 1 po daily 3 days then 1/2 po daily 2 days  Dispense: 16 tablet; Refill: 0    4. Multiple joint pain  -     CBC & Differential  -     Comprehensive Metabolic Panel  -     Sedimentation Rate  -     NOA With / DsDNA, RNP, Sjogrens A / B, Smith  -     Rheumatoid Factor  -     Uric Acid    5. Colon cancer screening  -     Cologuard - Stool, Per Rectum; Future    will check rheumatologic labs to ensure no insidious cause of multiple joint pains.  Will treat with pred burst.  F/u pending labs, would strongly consider PT.  Consider XR of hip in future.  Pt agrees    cologuard ordered as well

## 2022-02-22 LAB
ALBUMIN SERPL-MCNC: 4.5 G/DL (ref 3.8–4.9)
ALBUMIN/GLOB SERPL: 2 {RATIO} (ref 1.2–2.2)
ALP SERPL-CCNC: 65 IU/L (ref 44–121)
ALT SERPL-CCNC: 28 IU/L (ref 0–32)
ANA SER QL: NEGATIVE
AST SERPL-CCNC: 21 IU/L (ref 0–40)
BASOPHILS # BLD AUTO: 0.1 X10E3/UL (ref 0–0.2)
BASOPHILS NFR BLD AUTO: 1 %
BILIRUB SERPL-MCNC: 0.3 MG/DL (ref 0–1.2)
BUN SERPL-MCNC: 14 MG/DL (ref 6–24)
BUN/CREAT SERPL: 18 (ref 9–23)
CALCIUM SERPL-MCNC: 9.1 MG/DL (ref 8.7–10.2)
CHLORIDE SERPL-SCNC: 104 MMOL/L (ref 96–106)
CO2 SERPL-SCNC: 22 MMOL/L (ref 20–29)
CREAT SERPL-MCNC: 0.77 MG/DL (ref 0.57–1)
EOSINOPHIL # BLD AUTO: 0.2 X10E3/UL (ref 0–0.4)
EOSINOPHIL NFR BLD AUTO: 3 %
ERYTHROCYTE [DISTWIDTH] IN BLOOD BY AUTOMATED COUNT: 12.8 % (ref 11.7–15.4)
ERYTHROCYTE [SEDIMENTATION RATE] IN BLOOD BY WESTERGREN METHOD: 2 MM/HR (ref 0–40)
GLOBULIN SER CALC-MCNC: 2.3 G/DL (ref 1.5–4.5)
GLUCOSE SERPL-MCNC: 98 MG/DL (ref 65–99)
HCT VFR BLD AUTO: 41.7 % (ref 34–46.6)
HGB BLD-MCNC: 14.1 G/DL (ref 11.1–15.9)
IMM GRANULOCYTES # BLD AUTO: 0 X10E3/UL (ref 0–0.1)
IMM GRANULOCYTES NFR BLD AUTO: 0 %
LYMPHOCYTES # BLD AUTO: 1.9 X10E3/UL (ref 0.7–3.1)
LYMPHOCYTES NFR BLD AUTO: 29 %
MCH RBC QN AUTO: 28.5 PG (ref 26.6–33)
MCHC RBC AUTO-ENTMCNC: 33.8 G/DL (ref 31.5–35.7)
MCV RBC AUTO: 84 FL (ref 79–97)
MONOCYTES # BLD AUTO: 0.4 X10E3/UL (ref 0.1–0.9)
MONOCYTES NFR BLD AUTO: 6 %
NEUTROPHILS # BLD AUTO: 3.9 X10E3/UL (ref 1.4–7)
NEUTROPHILS NFR BLD AUTO: 61 %
PLATELET # BLD AUTO: 176 X10E3/UL (ref 150–450)
POTASSIUM SERPL-SCNC: 4 MMOL/L (ref 3.5–5.2)
PROT SERPL-MCNC: 6.8 G/DL (ref 6–8.5)
RBC # BLD AUTO: 4.95 X10E6/UL (ref 3.77–5.28)
RHEUMATOID FACT SERPL-ACNC: <10 IU/ML
SODIUM SERPL-SCNC: 142 MMOL/L (ref 134–144)
URATE SERPL-MCNC: 3.1 MG/DL (ref 3–7.2)
WBC # BLD AUTO: 6.5 X10E3/UL (ref 3.4–10.8)

## 2022-03-16 ENCOUNTER — TELEPHONE (OUTPATIENT)
Dept: FAMILY MEDICINE CLINIC | Facility: CLINIC | Age: 52
End: 2022-03-16

## 2022-03-16 DIAGNOSIS — M25.50 MULTIPLE JOINT PAIN: Primary | ICD-10-CM

## 2022-03-16 RX ORDER — ETODOLAC 400 MG/1
400 TABLET, FILM COATED ORAL 2 TIMES DAILY
Qty: 60 TABLET | Refills: 1 | Status: SHIPPED | OUTPATIENT
Start: 2022-03-16 | End: 2022-10-18

## 2022-03-16 NOTE — TELEPHONE ENCOUNTER
Lets use lodine, a strong NSAID.  Script sent in    Labs were normal and looked great, I reviewed them today

## 2022-03-16 NOTE — TELEPHONE ENCOUNTER
Pt would like call from either dr. ohara or the nurse about the inflammation in her knees and elbows. She was seen recently for this.

## 2022-03-16 NOTE — TELEPHONE ENCOUNTER
Pt was in a few weeks ago and you put her on prednisone and it only helped slightly. Now that she has finished it, the symptoms returned again and worse than before. She has been taking Naproxen but it is not helping. She wants to know if you can send in a stronger NSAID or what you suggest next?

## 2022-10-06 ENCOUNTER — TRANSCRIBE ORDERS (OUTPATIENT)
Dept: ADMINISTRATIVE | Facility: HOSPITAL | Age: 52
End: 2022-10-06

## 2022-10-06 DIAGNOSIS — Z12.31 VISIT FOR SCREENING MAMMOGRAM: Primary | ICD-10-CM

## 2022-10-18 ENCOUNTER — OFFICE VISIT (OUTPATIENT)
Dept: OBSTETRICS AND GYNECOLOGY | Facility: CLINIC | Age: 52
End: 2022-10-18

## 2022-10-18 VITALS
WEIGHT: 170.6 LBS | SYSTOLIC BLOOD PRESSURE: 115 MMHG | BODY MASS INDEX: 24.42 KG/M2 | DIASTOLIC BLOOD PRESSURE: 80 MMHG | HEIGHT: 70 IN

## 2022-10-18 DIAGNOSIS — R23.2 HOT FLASHES: ICD-10-CM

## 2022-10-18 DIAGNOSIS — Z01.419 WOMEN'S ANNUAL ROUTINE GYNECOLOGICAL EXAMINATION: Primary | ICD-10-CM

## 2022-10-18 PROCEDURE — 99396 PREV VISIT EST AGE 40-64: CPT | Performed by: NURSE PRACTITIONER

## 2022-10-18 NOTE — PROGRESS NOTES
Gynecologic Annual Exam Note          GYN Annual Exam     Gynecologic Exam (Annual)        Subjective     HPI  Joann Nuñez is a 51 y.o. female, , who presents for annual well woman exam as a established patient, former Dr. Munoz patient. Patient's last menstrual period was 10/01/2020 (approximate). She reports that she has not a period in over a year.  Partner Status: Marital Status: . She is is sexually active. She has not had new partners.. STD testing recommendations have been explained to the patient and she does not desire STD testing. There were no changes to her medical or surgical history since her last visit.     She states she has been feeling indifferent about several things in her life and that she just doesn't feel good. She reports that she has a constant headache that is always there, she describes it as a constant aching pain. She states that it is often at the back of her head and wraps around her neck and the side of her head, and sometimes it is at the front of her head. She reports she has migraines, about 2 per year. She reports she has been experiencing night sweats, hot flashes, weight gain, joint aches, irritability, and feels like her tolerance level for others is low. She has had labs in 2022 with PCP and all were normal. She denies depression symptoms.       Additional OB/GYN History   Current contraception: contraceptive methods: Post menopausal status  Desires to: do not start contraception    Last Pap : 3/16/2021. Result: negative. HPV: not done  Last Completed Pap Smear          Ordered - PAP SMEAR (Every 3 Years) Ordered on 10/18/2022    2021  Liquid-based Pap Smear, Screening    2020  Done    10/17/2016  Done              History of abnormal Pap smear: no  Family history of uterine, colon, breast, or ovarian cancer: no  Performs monthly Self-Breast Exam: yes  Last mammogram: 2021. Done at , negative/normal. Patient has a mammgram  schedule for 2022.     Last Completed Mammogram          Scheduled - MAMMOGRAM (Every 2 Years) Scheduled for 2022  Mammo Screening Digital Tomosynthesis Bilateral With CAD    2020  Mammo Screening Digital Tomosynthesis Bilateral With CAD    2017  Mammo diagnostic digital tomosynthesis right w CAD    01/10/2017  Mammo Screening Digital Tomosynthesis Bilateral With CAD                History of abnormal mammogram: no    Colonoscopy: has had a colonoscopy several year(s) ago. Patient completed a Cologard 3/3/2022, results were negative.   Exercises Regularly: yes, occasionally  Feelings of Anxiety or Depression: no  Tobacco Usage?: No       Current Outpatient Medications:   •  Multiple Vitamins-Minerals (MULTIVITAMIN ADULT PO), Take 1 tablet by mouth daily., Disp: , Rfl:      Patient denies the need for medication refills today.    OB History        2    Para   2    Term   2            AB        Living           SAB        IAB        Ectopic        Molar        Multiple        Live Births                    Past Medical History:   Diagnosis Date   • Allergic    • Migraine    • Multiple gestation 98        Past Surgical History:   Procedure Laterality Date   •  SECTION      x2       Health Maintenance   Topic Date Due   • Annual Gynecologic Pelvic and Breast Exam  Never done   • TDAP/TD VACCINES (1 - Tdap) Never done   • ZOSTER VACCINE (1 of 2) Never done   • COVID-19 Vaccine (2 - Booster for Yonis series) 2021   • ANNUAL PHYSICAL  2022   • INFLUENZA VACCINE  Never done   • MAMMOGRAM  2023   • PAP SMEAR  2024   • COLORECTAL CANCER SCREENING  2025   • HEPATITIS C SCREENING  Completed   • Pneumococcal Vaccine 0-64  Aged Out       The additional following portions of the patient's history were reviewed and updated as appropriate: allergies, current medications, past family history, past medical history, past social history and  "past surgical history.    Review of Systems   Constitutional: Negative.    Cardiovascular: Negative.    Gastrointestinal: Negative.    Endocrine: Positive for heat intolerance ( hot flashes).   Genitourinary: Negative.         Night sweats, hot flashes     Psychiatric/Behavioral: Negative.          I have reviewed and agree with the HPI, ROS, and historical information as entered above. Jenna Ogden, APRN      Objective   /80   Ht 177.8 cm (70\")   Wt 77.4 kg (170 lb 9.6 oz)   LMP 10/01/2020 (Approximate)   BMI 24.48 kg/m²     Physical Exam  Vitals and nursing note reviewed. Exam conducted with a chaperone present.   Constitutional:       Appearance: She is well-developed.   HENT:      Head: Normocephalic and atraumatic.   Neck:      Thyroid: No thyroid mass or thyromegaly.   Cardiovascular:      Rate and Rhythm: Normal rate and regular rhythm.      Heart sounds: No murmur heard.  Pulmonary:      Effort: Pulmonary effort is normal. No retractions.      Breath sounds: Normal breath sounds. No wheezing, rhonchi or rales.   Chest:      Chest wall: No mass or tenderness.   Breasts:     Right: Normal. No mass, nipple discharge, skin change or tenderness.      Left: Normal. No mass, nipple discharge, skin change or tenderness.   Abdominal:      General: Bowel sounds are normal.      Palpations: Abdomen is soft. Abdomen is not rigid. There is no mass.      Tenderness: There is no abdominal tenderness. There is no guarding.      Hernia: No hernia is present.   Genitourinary:     General: Normal vulva.      Labia:         Right: No rash, tenderness or lesion.         Left: No rash, tenderness or lesion.       Vagina: Normal. No vaginal discharge or lesions.      Cervix: No cervical motion tenderness, discharge, lesion or cervical bleeding.      Uterus: Normal. Not enlarged, not fixed and not tender.       Adnexa: Right adnexa normal and left adnexa normal.        Right: No mass or tenderness.          Left: No " mass or tenderness.        Rectum: Normal. No external hemorrhoid.   Musculoskeletal:      Cervical back: Normal range of motion. No muscular tenderness.   Neurological:      Mental Status: She is alert and oriented to person, place, and time.   Psychiatric:         Behavior: Behavior normal.            Assessment and Plan    Problem List Items Addressed This Visit    None  Visit Diagnoses     Women's annual routine gynecological examination    -  Primary    Relevant Orders    LIQUID-BASED PAP SMEAR, [facility] ([facility])    LIQUID-BASED PAP SMEAR, [facility] ([facility])    Hot flashes              1. GYN annual well woman exam.   2. Has screening mammogram scheduled 11/22/22  3. Will try Estroven OTC for menopausal symptoms  4. Pap guidelines reviewed.  5. Reviewed monthly self breast exams.  Instructed to call with lumps, pain, or breast discharge.    6. Reviewed exercise as a preventative health measures.   7. Reccommended Flu Vaccine in Fall of each year.  8. Symptoms of menopausal transition reviewed with patient.   9. RTC in 1 year or PRN with problems.      Jenna Ogden, APRN  10/18/2022

## 2022-10-21 LAB — REF LAB TEST METHOD: NORMAL

## 2022-11-22 ENCOUNTER — HOSPITAL ENCOUNTER (OUTPATIENT)
Dept: MAMMOGRAPHY | Facility: HOSPITAL | Age: 52
Discharge: HOME | End: 2022-11-22
Admitting: FAMILY MEDICINE

## 2022-11-22 DIAGNOSIS — Z12.31 VISIT FOR SCREENING MAMMOGRAM: ICD-10-CM

## 2022-11-22 PROCEDURE — 77063 BREAST TOMOSYNTHESIS BI: CPT

## 2022-11-22 PROCEDURE — 77063 BREAST TOMOSYNTHESIS BI: CPT | Performed by: RADIOLOGY

## 2022-11-22 PROCEDURE — 77067 SCR MAMMO BI INCL CAD: CPT | Performed by: RADIOLOGY

## 2022-11-22 PROCEDURE — 77067 SCR MAMMO BI INCL CAD: CPT

## 2022-12-06 ENCOUNTER — TELEPHONE (OUTPATIENT)
Dept: FAMILY MEDICINE CLINIC | Facility: CLINIC | Age: 52
End: 2022-12-06

## 2022-12-06 RX ORDER — ETODOLAC 400 MG/1
400 TABLET, FILM COATED ORAL 2 TIMES DAILY
Qty: 60 TABLET | Refills: 2 | Status: SHIPPED | OUTPATIENT
Start: 2022-12-06 | End: 2022-12-19 | Stop reason: SDUPTHER

## 2022-12-06 NOTE — TELEPHONE ENCOUNTER
Can you refill medication requested. She should have had one refill at [facility] however they say non avail and she wants changed to [facility].

## 2022-12-06 NOTE — TELEPHONE ENCOUNTER
Caller: NIKKO VALDIVIA    Relationship: SELF    Best call back number: [phone]    What is the best time to reach you: ANYTIME    Who are you requesting to speak with (clinical staff, provider,  specific staff member): PROVIDER OR CLINICAL STAFF    What was the call regarding: [employer] INSURANCE    Do you require a callback: YES

## 2022-12-06 NOTE — TELEPHONE ENCOUNTER
I advised pt to call back and speak with Radha concerning [employer] ins issue. She had an appt in jan for a physical but wanted to see if she could be worked in by the end of December before we no longer accept her insurance. Also she needs a refill on etodolac to [facility].

## 2022-12-06 NOTE — TELEPHONE ENCOUNTER
We just got an email saying this issue will be fixed in future and to reassure pts we can see them in [place]

## 2022-12-09 ENCOUNTER — TELEPHONE (OUTPATIENT)
Dept: FAMILY MEDICINE CLINIC | Facility: CLINIC | Age: 52
End: 2022-12-09

## 2022-12-09 NOTE — TELEPHONE ENCOUNTER
Caller: Joann Nuñez    Relationship to patient: Self    Best call back number: [phone]    Type of visit: PHYSICAL    Requested date: BEFORE THE END OF THE YEAR    Additional notes:      PATIENT WAS SPEAKING WITH JOANN REGARDING ISSUES SHE WAS GOING TO TALK TO DR MOYER ABOUT GETTING PATIENT IN FOR A PHYSICAL BEFORE THE END OF THE YEAR    PATIENT HAS NOT HEARD ANYTHING

## 2022-12-19 ENCOUNTER — OFFICE VISIT (OUTPATIENT)
Dept: FAMILY MEDICINE CLINIC | Facility: CLINIC | Age: 52
End: 2022-12-19

## 2022-12-19 ENCOUNTER — HOSPITAL ENCOUNTER (OUTPATIENT)
Dept: GENERAL RADIOLOGY | Facility: HOSPITAL | Age: 52
Discharge: HOME | End: 2022-12-19
Admitting: FAMILY MEDICINE

## 2022-12-19 VITALS
TEMPERATURE: 97.3 F | DIASTOLIC BLOOD PRESSURE: 78 MMHG | WEIGHT: 170 LBS | HEIGHT: 70 IN | RESPIRATION RATE: 18 BRPM | HEART RATE: 84 BPM | SYSTOLIC BLOOD PRESSURE: 124 MMHG | BODY MASS INDEX: 24.34 KG/M2

## 2022-12-19 DIAGNOSIS — G89.29 CHRONIC HIP PAIN, RIGHT: ICD-10-CM

## 2022-12-19 DIAGNOSIS — M25.551 CHRONIC HIP PAIN, RIGHT: ICD-10-CM

## 2022-12-19 DIAGNOSIS — G44.209 TENSION HEADACHE: ICD-10-CM

## 2022-12-19 DIAGNOSIS — Z00.00 WELL WOMAN EXAM (NO GYNECOLOGICAL EXAM): Primary | ICD-10-CM

## 2022-12-19 DIAGNOSIS — Z83.42 FAMILY HISTORY OF HYPERCHOLESTEROLEMIA: ICD-10-CM

## 2022-12-19 DIAGNOSIS — R23.2 HOT FLASHES: ICD-10-CM

## 2022-12-19 DIAGNOSIS — G43.009 MIGRAINE WITHOUT AURA AND WITHOUT STATUS MIGRAINOSUS, NOT INTRACTABLE: ICD-10-CM

## 2022-12-19 PROCEDURE — 99396 PREV VISIT EST AGE 40-64: CPT | Performed by: FAMILY MEDICINE

## 2022-12-19 PROCEDURE — 73502 X-RAY EXAM HIP UNI 2-3 VIEWS: CPT

## 2022-12-19 RX ORDER — SUMATRIPTAN 100 MG/1
TABLET, FILM COATED ORAL
Qty: 9 TABLET | Refills: 5 | Status: SHIPPED | OUTPATIENT
Start: 2022-12-19

## 2022-12-19 RX ORDER — ETODOLAC 400 MG/1
400 TABLET, FILM COATED ORAL 2 TIMES DAILY
Qty: 60 TABLET | Refills: 2 | Status: SHIPPED | OUTPATIENT
Start: 2022-12-19

## 2022-12-19 RX ORDER — CYCLOBENZAPRINE HCL 10 MG
TABLET ORAL
Qty: 30 TABLET | Refills: 2 | Status: SHIPPED | OUTPATIENT
Start: 2022-12-19

## 2022-12-19 NOTE — PROGRESS NOTES
"Subjective     Chief Complaint   Patient presents with   • Annual Exam       Joann Nuñez is a 52 y.o. female who presents for an annual exam. The patient has no complaints today. The patient is sexually active. GYN screening history: last pap: approximate date  and was normal. The patient wears seatbelts: yes. The patient participates in regular exercise: yes. Has the patient ever been transfused or tattooed?: no. The patient reports that there is not domestic violence in her life.     History of abnormal Pap smear: no  Family history of uterine or ovarian cancer: no  Family history of colon cancer: no  History of abnormal mammogram: no  Family history of breast cancer: no  Colonoscopy history:   3/22 had cologuard      She has a mild HA around her head  Like a band that goes around her head  Not intense nor debilitating pain but is annoying  Timing is very sporadic  Nothing in particular makes it better nor worse  Migraines a little worse and these are intense HA that make her stop what she is doing        She continues to have lingering R hip pain  NSAIDs do help   However the pain is keeping her up at night            Menstrual History:  OB History        2    Para   2    Term   2            AB        Living           SAB        IAB        Ectopic        Molar        Multiple        Live Births                   Patient's last menstrual period was 10/01/2020 (approximate).         The following portions of the patient's history were reviewed and updated as appropriate:vital signs, allergies, current medications, past family history, past medical history, past social history, past surgical history and problem list    Review of Systems   Pertinent items are noted in HPI.     Objective     /78   Pulse 84   Temp 97.3 °F (36.3 °C)   Resp 18   Ht 177.8 cm (70\")   Wt 77.1 kg (170 lb)   LMP 10/01/2020 (Approximate)   BMI 24.39 kg/m²       Physical Exam  Vitals and nursing " note reviewed.   Constitutional:       General: She is not in acute distress.     Appearance: Normal appearance. She is well-developed.   HENT:      Head: Normocephalic and atraumatic.      Right Ear: Tympanic membrane, ear canal and external ear normal.      Left Ear: Tympanic membrane, ear canal and external ear normal.      Nose: Nose normal.   Eyes:      Extraocular Movements: Extraocular movements intact.      Conjunctiva/sclera: Conjunctivae normal.   Neck:      Thyroid: No thyromegaly.   Cardiovascular:      Rate and Rhythm: Normal rate and regular rhythm.      Heart sounds: Normal heart sounds. No murmur heard.  Pulmonary:      Effort: Pulmonary effort is normal. No respiratory distress.      Breath sounds: Normal breath sounds.   Abdominal:      General: Bowel sounds are normal. There is no distension.      Palpations: Abdomen is soft.      Tenderness: There is no abdominal tenderness.   Musculoskeletal:      Cervical back: Normal range of motion and neck supple.   Lymphadenopathy:      Cervical: No cervical adenopathy.   Skin:     General: Skin is warm and dry.   Neurological:      Mental Status: She is alert and oriented to person, place, and time.   Psychiatric:         Mood and Affect: Mood normal.         Behavior: Behavior normal.         Thought Content: Thought content normal.         Judgment: Judgment normal.             Assessment & Plan     ASSESSMENT  Healthy female exam.    1. Well woman exam (no gynecological exam)    2. Migraine without aura and without status migrainosus, not intractable    3. Chronic hip pain, right    4. Hot flashes    5. Tension headache    6. Family history of hypercholesterolemia         PLAN  1.   Orders Placed This Encounter   Procedures   • XR Hip With or Without Pelvis 2 - 3 View Right   • Comprehensive Metabolic Panel   • Lipid Panel   • Estrogens, Total   • FSH & LH   • Progesterone   • TSH   • Ambulatory Referral to Orthopedic Surgery   • CBC & Differential        2. Medications prescribed this encounter:      New Medications Ordered This Visit   Medications   • SUMAtriptan (Imitrex) 100 MG tablet     Sig: Take one tablet at onset of headache. May repeat dose one time in 2 hours if headache not relieved.     Dispense:  9 tablet     Refill:  5   • cyclobenzaprine (FLEXERIL) 10 MG tablet     Si PO QHS     Dispense:  30 tablet     Refill:  2   • etodolac (LODINE) 400 MG tablet     Sig: Take 1 tablet by mouth 2 (Two) Times a Day.     Dispense:  60 tablet     Refill:  2       3. XR hip and ortho for evaluation since this is causing night pain disturbing sleep    Ok PRN flexeril and lodine for pain    Will use imitrex PRN for migraines.    Pasha Acosta MD  2022

## 2022-12-28 ENCOUNTER — NURSE TRIAGE (OUTPATIENT)
Dept: CALL CENTER | Facility: HOSPITAL | Age: 52
End: 2022-12-28

## 2022-12-28 ENCOUNTER — TELEPHONE (OUTPATIENT)
Dept: FAMILY MEDICINE CLINIC | Facility: CLINIC | Age: 52
End: 2022-12-28

## 2022-12-29 DIAGNOSIS — E87.5 POTASSIUM SERUM INCREASED: Primary | ICD-10-CM

## 2022-12-29 DIAGNOSIS — E87.5 HYPERKALEMIA: Primary | ICD-10-CM

## 2022-12-29 LAB
ALBUMIN SERPL-MCNC: 4.1 G/DL (ref 3.5–5.2)
ALBUMIN/GLOB SERPL: 1.7 G/DL
ALP SERPL-CCNC: 78 U/L (ref 39–117)
ALT SERPL-CCNC: 35 U/L (ref 1–33)
AST SERPL-CCNC: 21 U/L (ref 1–32)
BASOPHILS # BLD AUTO: 0.06 10*3/MM3 (ref 0–0.2)
BASOPHILS NFR BLD AUTO: 1 % (ref 0–1.5)
BILIRUB SERPL-MCNC: 0.3 MG/DL (ref 0–1.2)
BUN SERPL-MCNC: 15 MG/DL (ref 6–20)
BUN SERPL-MCNC: 16 MG/DL (ref 6–20)
BUN/CREAT SERPL: 16 (ref 7–25)
BUN/CREAT SERPL: 19 (ref 7–25)
CALCIUM SERPL-MCNC: 9.2 MG/DL (ref 8.6–10.5)
CHLORIDE SERPL-SCNC: 105 MMOL/L (ref 98–107)
CHLORIDE SERPL-SCNC: 107 MMOL/L (ref 98–107)
CHOLEST SERPL-MCNC: 180 MG/DL (ref 0–200)
CO2 SERPL-SCNC: 24.7 MMOL/L (ref 22–29)
CO2 SERPL-SCNC: 24.8 MMOL/L (ref 22–29)
CREAT SERPL-MCNC: 0.84 MG/DL (ref 0.57–1)
CREAT SERPL-MCNC: 0.94 MG/DL (ref 0.57–1)
EGFRCR SERPLBLD CKD-EPI 2021: 72.7 ML/MIN/1.73
EGFRCR SERPLBLD CKD-EPI 2021: 83.2 ML/MIN/1.73
EOSINOPHIL # BLD AUTO: 0.18 10*3/MM3 (ref 0–0.4)
EOSINOPHIL NFR BLD AUTO: 2.9 % (ref 0.3–6.2)
ERYTHROCYTE [DISTWIDTH] IN BLOOD BY AUTOMATED COUNT: 13 % (ref 12.3–15.4)
ESTROGEN SERPL-MCNC: 74 PG/ML
FSH SERPL-ACNC: 94.2 MIU/ML
GLOBULIN SER CALC-MCNC: 2.4 GM/DL
GLUCOSE SERPL-MCNC: 68 MG/DL (ref 65–99)
GLUCOSE SERPL-MCNC: 83 MG/DL (ref 65–99)
HCT VFR BLD AUTO: 42.2 % (ref 34–46.6)
HDLC SERPL-MCNC: 44 MG/DL (ref 40–60)
HGB BLD-MCNC: 14 G/DL (ref 12–15.9)
IMM GRANULOCYTES # BLD AUTO: 0.01 10*3/MM3 (ref 0–0.05)
IMM GRANULOCYTES NFR BLD AUTO: 0.2 % (ref 0–0.5)
LDLC SERPL CALC-MCNC: 103 MG/DL (ref 0–100)
LH SERPL-ACNC: 74.3 MIU/ML
LYMPHOCYTES # BLD AUTO: 1.56 10*3/MM3 (ref 0.7–3.1)
LYMPHOCYTES NFR BLD AUTO: 24.8 % (ref 19.6–45.3)
MCH RBC QN AUTO: 28.3 PG (ref 26.6–33)
MCHC RBC AUTO-ENTMCNC: 33.2 G/DL (ref 31.5–35.7)
MCV RBC AUTO: 85.4 FL (ref 79–97)
MONOCYTES # BLD AUTO: 0.45 10*3/MM3 (ref 0.1–0.9)
MONOCYTES NFR BLD AUTO: 7.2 % (ref 5–12)
NEUTROPHILS # BLD AUTO: 4.03 10*3/MM3 (ref 1.7–7)
NEUTROPHILS NFR BLD AUTO: 63.9 % (ref 42.7–76)
PLATELET # BLD AUTO: 179 10*3/MM3 (ref 140–450)
POTASSIUM SERPL-SCNC: 4.2 MMOL/L (ref 3.5–5.2)
POTASSIUM SERPL-SCNC: 6.8 MMOL/L (ref 3.5–5.2)
PROGEST SERPL-MCNC: <0.1 NG/ML
PROT SERPL-MCNC: 6.5 G/DL (ref 6–8.5)
RBC # BLD AUTO: 4.94 10*6/MM3 (ref 3.77–5.28)
SODIUM SERPL-SCNC: 140 MMOL/L (ref 136–145)
SODIUM SERPL-SCNC: 142 MMOL/L (ref 136–145)
TRIGL SERPL-MCNC: 189 MG/DL (ref 0–150)
TSH SERPL DL<=0.005 MIU/L-ACNC: 3.15 UIU/ML (ref 0.27–4.2)
VLDLC SERPL CALC-MCNC: 33 MG/DL (ref 5–40)
WBC # BLD AUTO: 6.29 10*3/MM3 (ref 3.4–10.8)

## 2022-12-29 NOTE — TELEPHONE ENCOUNTER
Pt aware and understood. Expressed the importance of getting this done today. Stated she'll be here before 12p (that's when [facility] picks up)

## 2022-12-29 NOTE — TELEPHONE ENCOUNTER
On call nurse called with critical lab value, Potassium 6.8. Lab drawn yesterday, but not processed until today due to lab downtime. This has likely affected this result. (Similar situation with 2 other lab samples from other patients).   Please contact patient, as lab needs to recollected.     I did speak with her and she denied having any symptoms. She is aware to go to ED if she develops chest pain, palpitations, or any other new symptoms.

## 2022-12-29 NOTE — TELEPHONE ENCOUNTER
"Critical Lab value called K+ 6.8 drawn yesterday, not ran until today due to lab downtime, may have effected results.  Triager spoke with Dr. Sommers physician on-call notified of critical lab value K+ 6.8 lab drawn yesterday ran today.       Reason for Disposition  • Lab or radiology calling with CRITICAL test results    Additional Information  • Negative: Lab calling with strep throat test results and triager can call in prescription  • Negative: Lab calling with urinalysis test results and triager can call in prescription  • Negative: Medication questions  • Negative: ED call to PCP  • Negative: Physician call to PCP  • Negative: Call about patient who is currently hospitalized  • Negative: [1] Prescription not at pharmacy AND [2] was prescribed today by PCP  • Negative: [1] Follow-up call from patient regarding patient's clinical status AND [2] information urgent  • Negative: [1] Caller requests to speak ONLY to PCP AND [2] URGENT question  • Negative: [1] Caller requests to speak to PCP now AND [2] won't tell us reason for call  (Exception: if 10 pm to 6 am, caller must first discuss reason for the call)  • Negative: Notification of hospital admission  • Negative: Notification of death  • Negative: Caller requesting lab results    Answer Assessment - Initial Assessment Questions  1. REASON FOR CALL or QUESTION: \"What is your reason for calling today?\" or \"How can I best  help you?\" or \"What question do you have that I can help answer?\"      Critical lab value K+ 6.8  2. CALLER: Document the source of call. (e.g., laboratory, patient).  Lab    Protocols used: PCP CALL - NO TRIAGE-ADULT-      "

## 2022-12-30 ENCOUNTER — TELEPHONE (OUTPATIENT)
Dept: FAMILY MEDICINE CLINIC | Facility: CLINIC | Age: 52
End: 2022-12-30

## 2022-12-30 NOTE — TELEPHONE ENCOUNTER
Caller: Joann Nuñez    Relationship: Self    Best call back number: [phone]    What is the best time to reach you: ANY    Who are you requesting to speak with (clinical staff, provider,  specific staff member): CLINICAL    What was the call regarding: [employer]    Do you require a callback: YES    ADVISED PATIENT AS OF 1/1/23 [facility] DOES NOT ACCEPT [employer].  PATIENT HAD MORE QUESTIONS        normal (ped)...

## 2022-12-30 NOTE — TELEPHONE ENCOUNTER
Caller: Joann Nuñez    Relationship: Self    Best call back number: [phone]    What test was performed: LABS    When was the test performed: 12/27/22    Additional notes:     PLEASE CALL TO GIVE LAB RESULTS

## 2023-11-30 ENCOUNTER — TELEPHONE (OUTPATIENT)
Dept: FAMILY MEDICINE CLINIC | Facility: CLINIC | Age: 53
End: 2023-11-30
Payer: COMMERCIAL

## 2023-11-30 NOTE — TELEPHONE ENCOUNTER
Caller: Joann Nuñez    Relationship to patient: Self    Best call back number: [phone]     Chief complaint: PHYSICAL     Type of visit: PHYSICAL     Requested date: SOMETIME BEFORE THE END OF 2023     If rescheduling, when is the original appointment: 12/19/23, PATIENT STATES SOMETHING ELSE HAS COME UP THIS DAY AND SHE CANNOT MAKE THE APPOINTMENT.      Additional notes: PATIENT WOULD LIKE A CALLBACK TO CHECK IF A DIFFERENT DAY FOR HER PHYSICAL CAN BE SCHEDULED DUE TO NO OTHER APPOINTMENTS BEING AVAILABLE IN THE NEAR FUTURE ON PCP'S SCHEDULE.

## 2023-12-26 ENCOUNTER — OFFICE VISIT (OUTPATIENT)
Dept: FAMILY MEDICINE CLINIC | Facility: CLINIC | Age: 53
End: 2023-12-26
Payer: COMMERCIAL

## 2023-12-26 VITALS
HEART RATE: 72 BPM | SYSTOLIC BLOOD PRESSURE: 124 MMHG | TEMPERATURE: 97.1 F | HEIGHT: 70 IN | OXYGEN SATURATION: 98 % | DIASTOLIC BLOOD PRESSURE: 74 MMHG | BODY MASS INDEX: 23.82 KG/M2 | WEIGHT: 166.4 LBS

## 2023-12-26 DIAGNOSIS — R53.82 CHRONIC FATIGUE: ICD-10-CM

## 2023-12-26 DIAGNOSIS — Z00.00 WELL WOMAN EXAM (NO GYNECOLOGICAL EXAM): Primary | ICD-10-CM

## 2023-12-26 DIAGNOSIS — G43.009 MIGRAINE WITHOUT AURA AND WITHOUT STATUS MIGRAINOSUS, NOT INTRACTABLE: ICD-10-CM

## 2023-12-26 PROCEDURE — 99396 PREV VISIT EST AGE 40-64: CPT | Performed by: FAMILY MEDICINE

## 2023-12-26 RX ORDER — SUMATRIPTAN 100 MG/1
TABLET, FILM COATED ORAL
Qty: 9 TABLET | Refills: 5 | Status: SHIPPED | OUTPATIENT
Start: 2023-12-26

## 2023-12-26 NOTE — PROGRESS NOTES
"Subjective     Chief Complaint   Patient presents with    Annual Exam       Joann Nuñez is a 53 y.o. female who presents for an annual exam. The patient has no complaints today. The patient is sexually active. GYN screening history: last pap: approximate date 10/2022 and was normal. The patient wears seatbelts: yes. The patient participates in regular exercise: yes.    The patient reports that there is not domestic violence in her life.     History of abnormal Pap smear: no  Family history of uterine or ovarian cancer: no  Family history of colon cancer: no  History of abnormal mammogram: no  Family history of breast cancer: no  Colonoscopy history:   cologuard       Menstrual History:  OB History          2    Para   2    Term   2            AB        Living             SAB        IAB        Ectopic        Molar        Multiple        Live Births                     Patient's last menstrual period was 10/01/2020 (approximate).         The following portions of the patient's history were reviewed and updated as appropriate:vital signs, allergies, current medications, past family history, past medical history, past social history, past surgical history, and problem list    Review of Systems   A comprehensive review of systems was negative.     Objective     /74   Pulse 72   Temp 97.1 °F (36.2 °C) (Infrared)   Ht 177.8 cm (70\")   Wt 75.5 kg (166 lb 6.4 oz)   LMP 10/01/2020 (Approximate)   SpO2 98%   BMI 23.88 kg/m²       Physical Exam  Vitals and nursing note reviewed.   Constitutional:       General: She is not in acute distress.     Appearance: Normal appearance. She is well-developed.   HENT:      Head: Normocephalic and atraumatic.      Right Ear: Tympanic membrane, ear canal and external ear normal.      Left Ear: Tympanic membrane, ear canal and external ear normal.      Nose: Nose normal.   Eyes:      Extraocular Movements: Extraocular movements intact.      " Conjunctiva/sclera: Conjunctivae normal.   Neck:      Thyroid: No thyromegaly.   Cardiovascular:      Rate and Rhythm: Normal rate and regular rhythm.      Heart sounds: Normal heart sounds. No murmur heard.  Pulmonary:      Effort: Pulmonary effort is normal. No respiratory distress.      Breath sounds: Normal breath sounds.   Abdominal:      General: Bowel sounds are normal. There is no distension.      Palpations: Abdomen is soft.      Tenderness: There is no abdominal tenderness.   Musculoskeletal:      Cervical back: Normal range of motion and neck supple.   Lymphadenopathy:      Cervical: No cervical adenopathy.   Skin:     General: Skin is warm and dry.   Neurological:      Mental Status: She is alert and oriented to person, place, and time.   Psychiatric:         Mood and Affect: Mood normal.         Behavior: Behavior normal.         Thought Content: Thought content normal.         Judgment: Judgment normal.             Assessment & Plan     ASSESSMENT  Healthy female exam.    1. Well woman exam (no gynecological exam)    2. Chronic fatigue    3. Migraine without aura and without status migrainosus, not intractable         PLAN  1.   Orders Placed This Encounter   Procedures    TSH+Free T4    Comprehensive Metabolic Panel    FSH & LH    Estrogens, Total    Progesterone    CBC & Differential       2.  Counseled pt about well adult care.  She is worried about her weight but is 4 pounds lighter than last year!    Will recheck her hormone and thyroid levels and f/u pending labs    3.  Migraines doing well and have been better since seeing a chiropractor.  Ok PRN imitrex    Pasha Acosta MD  12/26/2023

## 2023-12-30 LAB
ALBUMIN SERPL-MCNC: 4.2 G/DL (ref 3.8–4.9)
ALBUMIN/GLOB SERPL: 1.7 {RATIO} (ref 1.2–2.2)
ALP SERPL-CCNC: 74 IU/L (ref 44–121)
ALT SERPL-CCNC: 27 IU/L (ref 0–32)
AST SERPL-CCNC: 16 IU/L (ref 0–40)
BASOPHILS # BLD AUTO: 0.1 X10E3/UL (ref 0–0.2)
BASOPHILS NFR BLD AUTO: 1 %
BILIRUB SERPL-MCNC: 0.4 MG/DL (ref 0–1.2)
BUN SERPL-MCNC: 14 MG/DL (ref 6–24)
BUN/CREAT SERPL: 14 (ref 9–23)
CALCIUM SERPL-MCNC: 9.4 MG/DL (ref 8.7–10.2)
CHLORIDE SERPL-SCNC: 104 MMOL/L (ref 96–106)
CO2 SERPL-SCNC: 24 MMOL/L (ref 20–29)
CREAT SERPL-MCNC: 1 MG/DL (ref 0.57–1)
EGFRCR SERPLBLD CKD-EPI 2021: 67 ML/MIN/1.73
EOSINOPHIL # BLD AUTO: 0.2 X10E3/UL (ref 0–0.4)
EOSINOPHIL NFR BLD AUTO: 3 %
ERYTHROCYTE [DISTWIDTH] IN BLOOD BY AUTOMATED COUNT: 12.5 % (ref 11.7–15.4)
ESTROGEN SERPL-MCNC: 25 PG/ML
FSH SERPL-ACNC: 118 MIU/ML
GLOBULIN SER CALC-MCNC: 2.5 G/DL (ref 1.5–4.5)
GLUCOSE SERPL-MCNC: 98 MG/DL (ref 70–99)
HCT VFR BLD AUTO: 42.8 % (ref 34–46.6)
HGB BLD-MCNC: 14.4 G/DL (ref 11.1–15.9)
IMM GRANULOCYTES # BLD AUTO: 0 X10E3/UL (ref 0–0.1)
IMM GRANULOCYTES NFR BLD AUTO: 0 %
LH SERPL-ACNC: 82 MIU/ML
LYMPHOCYTES # BLD AUTO: 1.7 X10E3/UL (ref 0.7–3.1)
LYMPHOCYTES NFR BLD AUTO: 30 %
MCH RBC QN AUTO: 28.1 PG (ref 26.6–33)
MCHC RBC AUTO-ENTMCNC: 33.6 G/DL (ref 31.5–35.7)
MCV RBC AUTO: 84 FL (ref 79–97)
MONOCYTES # BLD AUTO: 0.4 X10E3/UL (ref 0.1–0.9)
MONOCYTES NFR BLD AUTO: 7 %
NEUTROPHILS # BLD AUTO: 3.4 X10E3/UL (ref 1.4–7)
NEUTROPHILS NFR BLD AUTO: 59 %
PLATELET # BLD AUTO: 182 X10E3/UL (ref 150–450)
POTASSIUM SERPL-SCNC: 4.4 MMOL/L (ref 3.5–5.2)
PROGEST SERPL-MCNC: 0.2 NG/ML
PROT SERPL-MCNC: 6.7 G/DL (ref 6–8.5)
RBC # BLD AUTO: 5.12 X10E6/UL (ref 3.77–5.28)
SODIUM SERPL-SCNC: 140 MMOL/L (ref 134–144)
T4 FREE SERPL-MCNC: 1.17 NG/DL (ref 0.82–1.77)
TSH SERPL DL<=0.005 MIU/L-ACNC: 2.3 UIU/ML (ref 0.45–4.5)
WBC # BLD AUTO: 5.8 X10E3/UL (ref 3.4–10.8)

## 2024-06-24 DIAGNOSIS — G43.009 MIGRAINE WITHOUT AURA AND WITHOUT STATUS MIGRAINOSUS, NOT INTRACTABLE: ICD-10-CM

## 2024-06-24 RX ORDER — SUMATRIPTAN 100 MG/1
TABLET, FILM COATED ORAL
Qty: 9 TABLET | Refills: 2 | Status: SHIPPED | OUTPATIENT
Start: 2024-06-24

## 2024-06-24 NOTE — TELEPHONE ENCOUNTER
Caller: Joann Nuñez    Relationship: Self    Best call back number: [phone]     Requested Prescriptions:   Requested Prescriptions     Pending Prescriptions Disp Refills    SUMAtriptan (Imitrex) 100 MG tablet 9 tablet 5     Sig: Take one tablet at onset of headache. May repeat dose one time in 2 hours if headache not relieved.      Pharmacy where request should be sent: [facility] [address] [place] [address] [phone] [facility] [phone]      Last office visit with prescribing clinician: 12/26/2023   Last telemedicine visit with prescribing clinician: Visit date not found   Next office visit with prescribing clinician: Visit date not found     Does the patient have less than a 3 day supply:  [x] Yes  [] No    Cherry Leon   06/24/24 12:52 EDT

## 2024-12-30 ENCOUNTER — OFFICE VISIT (OUTPATIENT)
Dept: FAMILY MEDICINE CLINIC | Facility: CLINIC | Age: 54
End: 2024-12-30
Payer: COMMERCIAL

## 2024-12-30 VITALS
BODY MASS INDEX: 24.42 KG/M2 | OXYGEN SATURATION: 98 % | WEIGHT: 170.6 LBS | DIASTOLIC BLOOD PRESSURE: 78 MMHG | TEMPERATURE: 97.5 F | HEART RATE: 73 BPM | SYSTOLIC BLOOD PRESSURE: 116 MMHG | HEIGHT: 70 IN

## 2024-12-30 DIAGNOSIS — Z00.00 ANNUAL PHYSICAL EXAM: Primary | ICD-10-CM

## 2024-12-30 DIAGNOSIS — M25.551 BILATERAL HIP PAIN: ICD-10-CM

## 2024-12-30 DIAGNOSIS — F34.1 DYSTHYMIA: ICD-10-CM

## 2024-12-30 DIAGNOSIS — M25.552 BILATERAL HIP PAIN: ICD-10-CM

## 2024-12-30 DIAGNOSIS — G43.009 MIGRAINE WITHOUT AURA AND WITHOUT STATUS MIGRAINOSUS, NOT INTRACTABLE: ICD-10-CM

## 2024-12-30 DIAGNOSIS — Z12.31 SCREENING MAMMOGRAM FOR BREAST CANCER: ICD-10-CM

## 2024-12-30 PROCEDURE — 99396 PREV VISIT EST AGE 40-64: CPT | Performed by: FAMILY MEDICINE

## 2024-12-30 RX ORDER — DESVENLAFAXINE 50 MG/1
50 TABLET, FILM COATED, EXTENDED RELEASE ORAL DAILY
Qty: 30 TABLET | Refills: 2 | Status: SHIPPED | OUTPATIENT
Start: 2024-12-30

## 2024-12-30 RX ORDER — SUMATRIPTAN SUCCINATE 100 MG/1
TABLET ORAL
Qty: 9 TABLET | Refills: 2 | Status: SHIPPED | OUTPATIENT
Start: 2024-12-30

## 2024-12-30 NOTE — PROGRESS NOTES
"Subjective     Chief Complaint   Patient presents with    Annual Exam       Joann Nuñez is a 54 y.o. female who presents for an annual exam. The patient has some complaints today. The patient is sexually active. GYN screening history: last pap: approximate date  and was normal. The patient wears seatbelts: yes. The patient participates in regular exercise: no.  The patient reports that there is not domestic violence in her life.       Mood remains poor as she has stress with her   He has chronic pain that has worsened over the past  This continues to impact their lives and is emotionally hard    Leg pain keeps her from doing things she wants to do  She has pain in her B legs from lateral hip down lateral side of her legs  Keeps her up at night  Prevents her from exercising  She saw [facility] without diagnosis, she tried PT and tried injections without help  Lateral both legs and down to calf muscles to her knees  Nothing has helped  This has kept her from exercising as well  Saw JAUN in  but not Dr. Gardner!          Menstrual History:  OB History          2    Para   2    Term   2            AB        Living             SAB        IAB        Ectopic        Molar        Multiple        Live Births                     Patient's last menstrual period was 10/01/2020 (approximate).         The following portions of the patient's history were reviewed and updated as appropriate:vital signs, allergies, current medications, past family history, past medical history, past social history, past surgical history, and problem list    Review of Systems   Pertinent items are noted in HPI.     Objective     /78   Pulse 73   Temp 97.5 °F (36.4 °C)   Ht 177.8 cm (70\")   Wt 77.4 kg (170 lb 9.6 oz)   LMP 10/01/2020 (Approximate)   SpO2 98%   BMI 24.48 kg/m²       Physical Exam  Vitals and nursing note reviewed.   Constitutional:       General: She is not in acute distress.     Appearance: " Normal appearance. She is well-developed.   Cardiovascular:      Rate and Rhythm: Normal rate and regular rhythm.      Heart sounds: Normal heart sounds.   Pulmonary:      Effort: Pulmonary effort is normal.      Breath sounds: Normal breath sounds.   Neurological:      Mental Status: She is alert and oriented to person, place, and time.   Psychiatric:         Mood and Affect: Mood normal.         Behavior: Behavior normal.         Thought Content: Thought content normal.         Judgment: Judgment normal.             Assessment & Plan     ASSESSMENT  Healthy female exam.    1. Annual physical exam    2. Dysthymia    3. Migraine without aura and without status migrainosus, not intractable    4. Bilateral hip pain         PLAN  1.   Orders Placed This Encounter   Procedures    Ambulatory Referral to Orthopedic Surgery    Ambulatory Referral to Behavioral Health       2. Medications prescribed this encounter:      New Medications Ordered This Visit   Medications    desvenlafaxine (Pristiq) 50 MG 24 hr tablet     Sig: Take 1 tablet by mouth Daily.     Dispense:  30 tablet     Refill:  2    SUMAtriptan (Imitrex) 100 MG tablet     Sig: Take one tablet at onset of headache. May repeat dose one time in 2 hours if headache not relieved.     Dispense:  9 tablet     Refill:  2       3. Mood remains an issue.  Will try pristiq.  Pt to call back in one month with update.  Will also work on counseling to help deal with stressors.    4. Leg pain continues to inhibit her ability to be active.  Keeps her from good night sleep.  Will work on seeing Dr. Gardner for evaluation.  She has failed PT and injections in past.    5. Mammogram ordered    Pasha Acosta MD  12/30/2024

## 2025-02-06 ENCOUNTER — OFFICE VISIT (OUTPATIENT)
Dept: OBSTETRICS AND GYNECOLOGY | Facility: CLINIC | Age: 55
End: 2025-02-06
Payer: COMMERCIAL

## 2025-02-06 VITALS
DIASTOLIC BLOOD PRESSURE: 80 MMHG | HEIGHT: 70 IN | BODY MASS INDEX: 24.02 KG/M2 | WEIGHT: 167.8 LBS | SYSTOLIC BLOOD PRESSURE: 132 MMHG

## 2025-02-06 DIAGNOSIS — Z13.820 SCREENING FOR OSTEOPOROSIS: ICD-10-CM

## 2025-02-06 DIAGNOSIS — R53.83 OTHER FATIGUE: ICD-10-CM

## 2025-02-06 DIAGNOSIS — N95.1 MENOPAUSAL SYNDROME: ICD-10-CM

## 2025-02-06 DIAGNOSIS — R23.2 HOT FLASHES: ICD-10-CM

## 2025-02-06 DIAGNOSIS — Z01.419 WOMEN'S ANNUAL ROUTINE GYNECOLOGICAL EXAMINATION: Primary | ICD-10-CM

## 2025-02-06 DIAGNOSIS — Z01.89 LABORATORY PROCEDURE: ICD-10-CM

## 2025-02-06 RX ORDER — MELOXICAM 15 MG/1
1 TABLET ORAL DAILY
COMMUNITY
Start: 2025-01-20 | End: 2025-02-06

## 2025-02-06 RX ORDER — ESTRADIOL 0.04 MG/D
1 PATCH, EXTENDED RELEASE TRANSDERMAL 2 TIMES WEEKLY
Qty: 8 PATCH | Refills: 11 | Status: SHIPPED | OUTPATIENT
Start: 2025-02-06 | End: 2026-02-06

## 2025-02-06 RX ORDER — PROGESTERONE 100 MG/1
100 CAPSULE ORAL
Qty: 30 CAPSULE | Refills: 12 | Status: SHIPPED | OUTPATIENT
Start: 2025-02-06

## 2025-02-06 NOTE — PROGRESS NOTES
Gynecologic Annual Exam Note        GYN Annual Exam     CC - Here for annual exam.   Hormone imbalance       HPI  Joann Nuñez is a 54 y.o. female, , who presents for annual well woman exam as a established patient.  She is postmenopausal.. Denies vaginal bleeding.   There were no changes to her medical or surgical history since her last visit. Marital Status: .  She is sexually active. She has not had new partners.. STD testing recommendations have been explained to the patient and she declines STD testing.    The patient would like to discuss the following complaints today: hormonal imbalance    Additional OB/GYN History   On HRT? No    Last Pap : 10/1/2022. Results: negative. HPV: negative.   Last Completed Pap Smear            Ordered - PAP SMEAR (Every 3 Years) Ordered on 2025      10/18/2022  LIQUID-BASED PAP SMEAR, [facility] ([facility])    2021  Liquid-based Pap Smear, Screening    2020  Done    10/17/2016  Done                  History of abnormal Pap smear: no  Family history of uterine, colon, breast, or ovarian cancer: no  Performs monthly Self-Breast Exam: yes  Last mammogram: 2022. Done at [facility]. There is a copy in the chart.    Last Completed Mammogram            Ordered - MAMMOGRAM (Every 2 Years) Ordered on 2024  Mammo Screening Digital Tomosynthesis Bilateral With CAD    2021  Mammo Screening Digital Tomosynthesis Bilateral With CAD    2020  Mammo Screening Digital Tomosynthesis Bilateral With CAD    2017  Mammo diagnostic digital tomosynthesis right w CAD    01/10/2017  Mammo Screening Digital Tomosynthesis Bilateral With CAD    Only the first 5 history entries have been loaded, but more history exists.                  Last colonoscopy: has had a cologuard 3 years ago- normal     Last Completed Colonoscopy       This patient has no relevant Health Maintenance data.            She has never had a  bone density scan  Exercises Regularly: no  Feelings of Anxiety or Depression: no      Tobacco Usage?: No       Current Outpatient Medications:     desvenlafaxine (Pristiq) 50 MG 24 hr tablet, Take 1 tablet by mouth Daily., Disp: 30 tablet, Rfl: 2    estradiol (Vivelle-Dot) 0.0375 MG/24HR patch, Place 1 patch on the skin as directed by provider 2 (Two) Times a Week., Disp: 8 patch, Rfl: 11    Progesterone (Prometrium) 100 MG capsule, Take 1 capsule by mouth every night at bedtime., Disp: 30 capsule, Rfl: 12    Patient denies the need for medication refills today.    OB History          2    Para   2    Term   2            AB        Living             SAB        IAB        Ectopic        Molar        Multiple        Live Births                    Past Medical History:   Diagnosis Date    Allergic     Migraine     Multiple gestation 98        Past Surgical History:   Procedure Laterality Date     SECTION      x2       Health Maintenance   Topic Date Due    TDAP/TD VACCINES (1 - Tdap) Never done    ZOSTER VACCINE (1 of 2) Never done    Annual Gynecologic Pelvic and Breast Exam  10/19/2023    COVID-19 Vaccine (2 -  season) 2024    MAMMOGRAM  2024    COLORECTAL CANCER SCREENING  2025    INFLUENZA VACCINE  2025 (Originally 2024)    PAP SMEAR  10/18/2025    ANNUAL PHYSICAL  2025    HEPATITIS C SCREENING  Completed    Pneumococcal Vaccine 0-64  Aged Out       The additional following portions of the patient's history were reviewed and updated as appropriate: allergies, current medications, past family history, past medical history, past social history, past surgical history, and problem list.    Review of Systems   Constitutional:  Positive for fatigue and unexpected weight gain.   Respiratory: Negative.     Cardiovascular: Negative.    Gastrointestinal: Negative.    Endocrine: Positive for heat intolerance.   Genitourinary: Negative.    Musculoskeletal:   "Positive for arthralgias and myalgias.   Psychiatric/Behavioral:  Positive for depressed mood and stress. Negative for suicidal ideas.        I have reviewed and agree with the HPI, ROS, and historical information as entered above. Moon TAVERA Padmini, APRN      Objective   /80   Ht 177.8 cm (70\")   Wt 76.1 kg (167 lb 12.8 oz)   LMP 10/01/2020 (Approximate)   BMI 24.08 kg/m²     Physical Exam  Constitutional:       Appearance: Normal appearance.   Neck:      Thyroid: No thyroid mass or thyromegaly.   Pulmonary:      Effort: Pulmonary effort is normal.   Chest:      Chest wall: No mass.   Breasts:     Right: Normal. No inverted nipple, mass, nipple discharge or skin change.      Left: Normal. No inverted nipple, mass, nipple discharge or skin change.   Abdominal:      General: There is no distension.      Palpations: Abdomen is soft. There is no mass.      Tenderness: There is no abdominal tenderness.      Hernia: No hernia is present.   Genitourinary:     General: Normal vulva.      Labia:         Right: No rash.         Left: No rash.       Vagina: Normal.      Cervix: No cervical motion tenderness or lesion.      Uterus: Normal.       Adnexa: Right adnexa normal and left adnexa normal.        Right: No mass or tenderness.          Left: No mass or tenderness.     Neurological:      Mental Status: She is alert.            Assessment and Plan    Problem List Items Addressed This Visit          Genitourinary and Reproductive     Menopausal syndrome    Overview     The patient has multiple complaints today including radiating hip pain, weight gain, fatigue, hot flashes, night sweats, depressed mood. LMP 2020 and this has been ongoing since around that time. Will check labs, PCP has recently started her on pristiq which she feels is helping some with mood. Labs today, will start HRT to see if she has improvement in symptoms. VTE risk and theoretical breast CA risk reviewed.           Other Visit Diagnoses       " Women's annual routine gynecological examination    -  Primary    Relevant Orders    LIQUID-BASED PAP SMEAR WITH HPV GENOTYPING REGARDLESS OF INTERPRETATION ([facility])    CBC (No Diff)    Comprehensive Metabolic Panel    TSH Rfx On Abnormal To Free T4    Lipid Panel    Laboratory procedure        Relevant Orders    CBC (No Diff)    Comprehensive Metabolic Panel    TSH Rfx On Abnormal To Free T4    Lipid Panel    Hot flashes        Relevant Orders    CBC (No Diff)    Comprehensive Metabolic Panel    TSH Rfx On Abnormal To Free T4    Lipid Panel    Other fatigue        Relevant Orders    CBC (No Diff)    Comprehensive Metabolic Panel    TSH Rfx On Abnormal To Free T4    Lipid Panel    Screening for osteoporosis        Relevant Orders    DEXA Bone Density Axial          She has an appointment to start counseling and has started Pristiq. We discussed that the HRT may not help her hip/leg pain, she has seen ortho and done PT with no relief. May consider discussing possibility of fibromyalgia, etc with her PCP.    GYN annual well woman exam.   Reviewed monthly self breast exams.  Instructed to call with lumps, pain, or breast discharge.  Yearly mammograms ordered.  Ordered mammogram today.  Recommended use of Vitamin D and getting adequate calcium in her diet. (1500mg)  Osteoporosis screening ordered today.  Reviewed [facility] ovarian cancer screening program.  Start vivelle dot .0375 twice weekly and nightly prometrium.   Return in about 3 months (around 5/6/2025) for F/U HRT.         Moon Washington, APRN  02/06/2025

## 2025-02-07 LAB
ALBUMIN SERPL-MCNC: 4.3 G/DL (ref 3.5–5.2)
ALBUMIN/GLOB SERPL: 1.6 G/DL
ALP SERPL-CCNC: 89 U/L (ref 39–117)
ALT SERPL-CCNC: 34 U/L (ref 1–33)
AST SERPL-CCNC: 27 U/L (ref 1–32)
BILIRUB SERPL-MCNC: 0.3 MG/DL (ref 0–1.2)
BUN SERPL-MCNC: 14 MG/DL (ref 6–20)
BUN/CREAT SERPL: 15.2 (ref 7–25)
CALCIUM SERPL-MCNC: 9.3 MG/DL (ref 8.6–10.5)
CHLORIDE SERPL-SCNC: 104 MMOL/L (ref 98–107)
CHOLEST SERPL-MCNC: 197 MG/DL (ref 0–200)
CO2 SERPL-SCNC: 26.5 MMOL/L (ref 22–29)
CREAT SERPL-MCNC: 0.92 MG/DL (ref 0.57–1)
EGFRCR SERPLBLD CKD-EPI 2021: 73.7 ML/MIN/1.73
ERYTHROCYTE [DISTWIDTH] IN BLOOD BY AUTOMATED COUNT: 12.6 % (ref 12.3–15.4)
GLOBULIN SER CALC-MCNC: 2.7 GM/DL
GLUCOSE SERPL-MCNC: 93 MG/DL (ref 65–99)
HCT VFR BLD AUTO: 45.5 % (ref 34–46.6)
HDLC SERPL-MCNC: 47 MG/DL (ref 40–60)
HGB BLD-MCNC: 14.8 G/DL (ref 12–15.9)
LDLC SERPL CALC-MCNC: 135 MG/DL (ref 0–100)
MCH RBC QN AUTO: 27.7 PG (ref 26.6–33)
MCHC RBC AUTO-ENTMCNC: 32.5 G/DL (ref 31.5–35.7)
MCV RBC AUTO: 85 FL (ref 79–97)
PLATELET # BLD AUTO: 193 10*3/MM3 (ref 140–450)
POTASSIUM SERPL-SCNC: 4.4 MMOL/L (ref 3.5–5.2)
PROT SERPL-MCNC: 7 G/DL (ref 6–8.5)
RBC # BLD AUTO: 5.35 10*6/MM3 (ref 3.77–5.28)
REF LAB TEST METHOD: NORMAL
SODIUM SERPL-SCNC: 141 MMOL/L (ref 136–145)
TRIGL SERPL-MCNC: 82 MG/DL (ref 0–150)
TSH SERPL DL<=0.005 MIU/L-ACNC: 2.28 UIU/ML (ref 0.27–4.2)
VLDLC SERPL CALC-MCNC: 15 MG/DL (ref 5–40)
WBC # BLD AUTO: 5.5 10*3/MM3 (ref 3.4–10.8)

## 2025-02-13 ENCOUNTER — PATIENT MESSAGE (OUTPATIENT)
Dept: FAMILY MEDICINE CLINIC | Facility: CLINIC | Age: 55
End: 2025-02-13
Payer: COMMERCIAL

## 2025-02-14 ENCOUNTER — CLINICAL SUPPORT (OUTPATIENT)
Dept: FAMILY MEDICINE CLINIC | Facility: CLINIC | Age: 55
End: 2025-02-14
Payer: COMMERCIAL

## 2025-02-14 DIAGNOSIS — Z00.00 ANNUAL PHYSICAL EXAM: Primary | ICD-10-CM

## 2025-02-14 LAB
EXPIRATION DATE: ABNORMAL
HBA1C MFR BLD: 6 % (ref 4.5–5.7)
Lab: ABNORMAL

## 2025-02-14 PROCEDURE — 83036 HEMOGLOBIN GLYCOSYLATED A1C: CPT | Performed by: FAMILY MEDICINE

## 2025-03-05 ENCOUNTER — TELEPHONE (OUTPATIENT)
Dept: FAMILY MEDICINE CLINIC | Facility: CLINIC | Age: 55
End: 2025-03-05
Payer: COMMERCIAL

## 2025-03-05 NOTE — TELEPHONE ENCOUNTER
Hub staff attempted to follow warm transfer process and was unsuccessful     Caller: Joann Nuñez    Relationship to patient: Self    Best call back number: [phone]     Patient is needing: A CALL BACK FROM [facility] WELLNESS RESULT FORM.

## 2025-03-11 ENCOUNTER — OFFICE VISIT (OUTPATIENT)
Dept: BEHAVIORAL HEALTH | Facility: CLINIC | Age: 55
End: 2025-03-11
Payer: COMMERCIAL

## 2025-03-11 DIAGNOSIS — F43.23 ADJUSTMENT DISORDER WITH MIXED ANXIETY AND DEPRESSED MOOD: Primary | ICD-10-CM

## 2025-03-11 NOTE — PROGRESS NOTES
[facility]                 Initial Assessment      Initial Adult Note     Date:2025   Patient Name: Joann Nuñez  : 1970   MRN: [id number]   Time IN: 9:45 AM    Time OUT: 10:45 AM     Referring Provider: Pasha Acosta MD    Chief Complaint:      ICD-10-CM ICD-9-CM   1. Adjustment disorder with mixed anxiety and depressed mood  F43.23 309.28      History of Present Illness:   Joann Nuñez is a 54 y.o. female who is being seen today for initial evaluation upon referral from primary care provider due to difficulty managing irritability as well as interpersonal tension with her . Patient contended that her  was placed on disability in  from his job as a  with the fire department due to a back injury. She contended that he lives with chronic back pain and PTSD and reported interpersonal tension at home. Patient affirmed depressed mood, insomnia, fatigue, appetite disturbance, trouble concentrating, difficulty managing worry, tension, restlessness, and irritability recently.    Past Psychiatric History:   Past diagnoses of dysthymia noted in patient's chart. Patient is currently prescribed Pristiq 50 mg by primary care provider. She contended that she initiated this medication about 3 months ago.    Subjective     Assessment Scores:   PHQ-9 Total Score: 10  LANIE-7 Total Score: 14    Work History:   Highest level of education obtained: Bachelor's degree  Ever been active duty in the ? no  Patient's Occupation: Patient is currently employed full-time in [employer].    Interpersonal/Relational:  Marital Status:  32 years  Support system:  Patient was unable to identify any current social supports; she currently lives with her  of 32 years and her 20-year-old son. Patient also has a 26-year-old son who lives away from the family.    Mental/Behavioral Health History:  History  of prior treatment or hospitalization: Past psychotropic medication reported  Past diagnoses: Dysthymia  Are there any significant health issues (current or past): Hormone replacement therapy due to menopausal syndrome reported  History of seizures: no    Family Psychiatric History:  -PTSD    History of Substance Use:  Patient denies any current substance use.    Significant Life Events:   Verbal, physical, sexual abuse? None reported at this time  Has patient experienced a death / loss of relationship? None reported at this time  Has patient experienced a major accident or tragic events? Yes; patient reported difficulty managing interpersonal tension with her  due to his symptoms of PTSD and chronic back pain.    Triggers: (Persons/Places/Things/Events/Thought/Emotions): Patient was unable to identify any specific triggers at this time.    Social History:   Social History     Socioeconomic History    Marital status:     Number of children: 2   Tobacco Use    Smoking status: Never    Smokeless tobacco: Never   Substance and Sexual Activity    Alcohol use: No    Drug use: No    Sexual activity: Yes     Partners: Male     Birth control/protection: Post-menopausal     Comment:         Past Medical History:   Past Medical History:   Diagnosis Date    Allergic     Migraine     Multiple gestation 89       Past Surgical History:   Past Surgical History:   Procedure Laterality Date     SECTION      x2       Family History:   Family History   Problem Relation Age of Onset    Heart disease Mother     Stroke Mother     Diabetes Mother     Heart disease Father     Kidney failure Father         Dialysis    Diabetes Father     No Known Problems Sister     No Known Problems Son     Breast cancer Neg Hx     Ovarian cancer Neg Hx        Medications:     Current Outpatient Medications:     desvenlafaxine (Pristiq) 50 MG 24 hr tablet, Take 1 tablet by mouth Daily., Disp: 30 tablet, Rfl: 2     estradiol (Vivelle-Dot) 0.0375 MG/24HR patch, Place 1 patch on the skin as directed by provider 2 (Two) Times a Week., Disp: 8 patch, Rfl: 11    Progesterone (Prometrium) 100 MG capsule, Take 1 capsule by mouth every night at bedtime., Disp: 30 capsule, Rfl: 12    Allergies:   Allergies   Allergen Reactions    Amoxicillin Nausea And Vomiting    Codeine        Objective     Mental Status Exam:   Hygiene:   good  Cooperation:  Cooperative  Eye Contact:  Good  Psychomotor Behavior:  Appropriate  Affect:   Tearful  Mood: anxious  Speech:  Normal  Thought Process:  Goal directed and Linear  Thought Content:  Mood congruent  Suicidal:  None  Homicidal:  None  Hallucinations:  None  Delusion:  None  Memory:  Intact  Orientation:  Person, Place, Time, and Situation  Reliability:  good  Insight:  Good  Judgement:  Good  Impulse Control:  Good  Physical/Medical Issues: None reported at this time    SUICIDE RISK ASSESSMENT/CSSRS:  1. Does patient have thoughts of suicide? no  2. Does patient have intent for suicide? no  3. Does patient have a current plan for suicide? no  4. History of suicide attempts: no  5. Family history of suicide or attempts: no  6. History of violent behaviors towards others or property or thoughts of suicide: no  7. History of sexual aggression toward others: no  8. Access to firearms or weapons: Yes; patient reported keeping firearms at home. She contended that they are securely stored.    Assessment / Plan      Visit Diagnosis/Orders Placed This Visit:    ICD-10-CM ICD-9-CM   1. Adjustment disorder with mixed anxiety and depressed mood  F43.23 309.28        PLAN:  Safety: No acute safety concerns  Risk Assessment: Risk of self-harm acutely is low. Risk of self-harm chronically is also low, but could be further elevated in the event of treatment noncompliance and/or AODA.    Patient met with the undersigned on this day in office for initial evaluation with MSW  present per patient  verbal consent. Therapist and patient reviewed and discussed patient's current symptoms and biopsychosocial history as indicated above. Patient denied any current suicidal or homicidal ideation. She further denied any death wishes or auditory/visual hallucinations; oriented to person, place, time, and situation. PHQ-9 and LANIE-7 screening tools administered in session. Patient will continue outpatient psychotherapy as scheduled.    Treatment Plan/ Short and Long Term Goals: Continue supportive psychotherapy efforts and medications as indicated. Treatment and medication options discussed during today's visit. Patient ackowledged and verbally consented to continue with current treatment plan and was educated on the importance of compliance with treatment and follow-up appointments. Patient seems reasonably able to adhere to treatment plan.      Assisted Patient in processing above session content; acknowledged and normalized patient’s thoughts, feelings, and concerns.  Rationalized patient thought process regarding biopsychosocial history and treatment options.      Allowed Patient to freely discuss issues  without interruption or judgement with unconditional positive regard, active listening skills, and empathy. Therapist provided a safe, confidential environment to facilitate the development of a positive therapeutic relationship and encouraged open, honest communication. Assisted Patient in identifying risk factors which would indicate the need for higher level of care including thoughts to harm self or others and/or self-harming behavior and encouraged Patient to call 911 or present to the nearest emergency room should any of these events occur. Discussed crisis intervention services and means to access. Patient adamantly and convincingly denies current suicidal or homicidal ideation or perceptual disturbance. Assisted Patient in processing session content; acknowledged and normalized Patient’s thoughts, feelings,  and concerns by utilizing a person-centered approach in efforts to build appropriate rapport and a positive therapeutic relationship with open and honest communication.     Quality Measures:     TOBACCO USE:  None reported at this time    Follow Up:   Return in about 2 months (around 5/15/2025) for Psychotherapy Follow-Up.      Joann Raphael, DARRIUS

## 2025-04-02 DIAGNOSIS — F34.1 DYSTHYMIA: ICD-10-CM

## 2025-04-02 RX ORDER — DESVENLAFAXINE 50 MG/1
50 TABLET, FILM COATED, EXTENDED RELEASE ORAL DAILY
Qty: 30 TABLET | Refills: 2 | Status: SHIPPED | OUTPATIENT
Start: 2025-04-02

## 2025-05-08 ENCOUNTER — OFFICE VISIT (OUTPATIENT)
Dept: OBSTETRICS AND GYNECOLOGY | Facility: CLINIC | Age: 55
End: 2025-05-08
Payer: COMMERCIAL

## 2025-05-08 VITALS
BODY MASS INDEX: 23.91 KG/M2 | WEIGHT: 167 LBS | SYSTOLIC BLOOD PRESSURE: 110 MMHG | DIASTOLIC BLOOD PRESSURE: 84 MMHG | HEIGHT: 70 IN

## 2025-05-08 DIAGNOSIS — R68.82 DECREASED LIBIDO: Primary | ICD-10-CM

## 2025-05-08 DIAGNOSIS — N95.1 MENOPAUSAL SYNDROME: ICD-10-CM

## 2025-05-08 PROCEDURE — 99213 OFFICE O/P EST LOW 20 MIN: CPT | Performed by: NURSE PRACTITIONER

## 2025-05-08 RX ORDER — TESTOSTERONE 10 MG/.5G
5 GEL, METERED TOPICAL DAILY
Qty: 60 G | Refills: 0 | Status: SHIPPED | OUTPATIENT
Start: 2025-05-08

## 2025-05-08 NOTE — PROGRESS NOTES
"            Chief Complaint   Patient presents with    Gynecologic Exam    Follow-up       Subjective   HPI  Joann Nuñez is a 54 y.o. female, . Her last LMP was Patient's last menstrual period was 10/01/2020 (approximate). who presents for follow up on HRT.      At her last visit she was treated with HRT. She is currently taking: Vivelle dot .0375 twice weekly and nightly prometrium.  Since then she reports the hot flashes and night sweats have improved. Patient states she is still feeling fatigued and still having and \"aching\" pain in both her thighs.  She also still has a decreased libido.         Additional OB/GYN History     Last Pap : 2025 Negative, HPV-  Last Completed Pap Smear            Upcoming       PAP SMEAR (Every 3 Years) Next due on 2025  LIQUID-BASED PAP SMEAR WITH HPV GENOTYPING REGARDLESS OF INTERPRETATION ([facility])    10/18/2022  LIQUID-BASED PAP SMEAR, [facility] ([facility])    2021  Liquid-based Pap Smear, Screening    2020  Done    10/17/2016  Done     Only the first 5 history entries have been loaded, but more history exists.                          Last mammogram: 2022 Done at . Report in chart  Last Completed Mammogram    This patient has no relevant Health Maintenance data.         Tobacco Usage?: No   OB History          2    Para   2    Term   2            AB        Living             SAB        IAB        Ectopic        Molar        Multiple        Live Births                      Current Outpatient Medications:     desvenlafaxine (Pristiq) 50 MG 24 hr tablet, Take 1 tablet by mouth Daily., Disp: 30 tablet, Rfl: 2    estradiol (Vivelle-Dot) 0.0375 MG/24HR patch, Place 1 patch on the skin as directed by provider 2 (Two) Times a Week., Disp: 8 patch, Rfl: 11    Progesterone (Prometrium) 100 MG capsule, Take 1 capsule by mouth every night at bedtime., Disp: 30 capsule, Rfl: 12    Testosterone 10 MG/ACT (2%) " "gel, Place 5 mg on the skin as directed by provider Daily., Disp: 60 g, Rfl: 0     Past Medical History:   Diagnosis Date    Allergic     Migraine     Multiple gestation 98        Past Surgical History:   Procedure Laterality Date     SECTION      x2       The additional following portions of the patient's history were reviewed and updated as appropriate: allergies, current medications, past family history, past medical history, past social history, past surgical history, and problem list.    Review of Systems   Constitutional:  Positive for fatigue.   Genitourinary:  Positive for decreased libido.   Musculoskeletal:  Positive for arthralgias and myalgias.       I have reviewed and agree with the HPI, ROS, and historical information as entered above. Moon Washington, APRN      Objective   /84   Ht 177.8 cm (70\")   Wt 75.8 kg (167 lb)   LMP 10/01/2020 (Approximate)   BMI 23.96 kg/m²     Physical Exam  Constitutional:       Appearance: Normal appearance.   Pulmonary:      Effort: Pulmonary effort is normal.   Neurological:      Mental Status: She is alert.         Assessment & Plan     Assessment     Problem List Items Addressed This Visit          Genitourinary and Reproductive     Menopausal syndrome          Overview    The patient has multiple complaints today including radiating hip pain, weight gain, fatigue, hot flashes, night sweats, depressed mood. LMP 2020 and this has been ongoing since around that time. Will check labs, PCP has recently started her on pristiq which she feels is helping some with mood. Labs today, will start HRT to see if she has improvement in symptoms. VTE risk and theoretical breast CA risk reviewed. Improved at F/U but still with decreased libido. Check labs and add testosterone compound.         Decreased libido - Primary    Overview   Could be related to pristiq. Will check labs and start compound testosterone. Will F/U 6 weeks for repeat labs. We discussed " this is not an FDA approved treatment and is a short term treatment not meant for > 2 years.          Relevant Medications    desvenlafaxine (Pristiq) 50 MG 24 hr tablet    Testosterone 10 MG/ACT (2%) gel    Other Relevant Orders    Testosterone    Sex Horm Binding Globulin       Hot flashes and night sweats have almost disappeared entirely. She has improvement of muscle aching but still with residual. Persistent fatigue as well. She has F/U with PCP and behavioral health to discuss these issues further. We have decided to keep HRT dosing where it is and add compounded testosterone cream with close follow up.     Plan     Continue vivelle dot .0375 and prometrium 100 mg qhs  Add compounded testosterone 5 mg daily.  Return in about 6 weeks (around 6/19/2025).        Moon Washington, APRN  05/08/2025

## 2025-05-09 LAB
SHBG SERPL-SCNC: 28.5 NMOL/L (ref 17.3–125)
TESTOST SERPL-MCNC: 14 NG/DL (ref 4–50)

## 2025-05-15 ENCOUNTER — OFFICE VISIT (OUTPATIENT)
Dept: BEHAVIORAL HEALTH | Facility: CLINIC | Age: 55
End: 2025-05-15
Payer: COMMERCIAL

## 2025-05-15 DIAGNOSIS — F43.23 ADJUSTMENT DISORDER WITH MIXED ANXIETY AND DEPRESSED MOOD: Primary | ICD-10-CM

## 2025-05-15 NOTE — PROGRESS NOTES
[facility]                  Follow Up Adult      Follow Up Adult Note     Date:05/15/2025   Patient Name: Joann Nuñez  : 1970   MRN: [id number]   Time IN: 4:00 PM    Time OUT: 4:45 PM     Referring Provider: Pasha Acosta MD    Chief Complaint:      ICD-10-CM ICD-9-CM   1. Adjustment disorder with mixed anxiety and depressed mood  F43.23 309.28      History of Present Illness:   Joann Nuñez is a 54 y.o. female who is being seen today for follow up individual Psychotherapy session.      Subjective     Patient's Support Network Includes:   and children    Functional Status: Mild impairment     Progress toward goal: Not at goal    Prognosis: Good with Ongoing Treatment     Medications:     Current Outpatient Medications:     desvenlafaxine (Pristiq) 50 MG 24 hr tablet, Take 1 tablet by mouth Daily., Disp: 30 tablet, Rfl: 2    estradiol (Vivelle-Dot) 0.0375 MG/24HR patch, Place 1 patch on the skin as directed by provider 2 (Two) Times a Week., Disp: 8 patch, Rfl: 11    Progesterone (Prometrium) 100 MG capsule, Take 1 capsule by mouth every night at bedtime., Disp: 30 capsule, Rfl: 12    Testosterone 10 MG/ACT (2%) gel, Place 5 mg on the skin as directed by provider Daily., Disp: 60 g, Rfl: 0    Allergies:   Allergies   Allergen Reactions    Amoxicillin Nausea And Vomiting    Codeine        Objective     Mental Status Exam:   Hygiene:   good  Cooperation:  Cooperative  Eye Contact:  Good  Psychomotor Behavior:  Appropriate  Affect:  Full range  Mood: Anxious  Speech:  Normal  Thought Process:  Goal directed and Linear  Thought Content:  Mood congruent  Suicidal:  None  Homicidal:  None  Hallucinations:  None  Delusion:  None  Memory:  Intact  Orientation:  Person, Place, Time, and Situation  Reliability:  good  Insight:  Good  Judgement:  Good  Impulse Control:  Good  Physical/Medical Issues:   None reported at this time       Assessment / Plan      Visit Diagnosis/Orders Placed This Visit:    ICD-10-CM ICD-9-CM   1. Adjustment disorder with mixed anxiety and depressed mood  F43.23 309.28      PLAN:  Safety: No acute safety concerns  Risk Assessment: Risk of self-harm acutely is low. Risk of self-harm chronically is also low, but could be further elevated in the event of treatment noncompliance and/or AODA.    Patient met with the undersigned on this day in office for individual psychotherapy session. Therapist facilitated patient exploration of the impact of symptoms, daily life stressors, and past trauma upon current thoughts, feelings, behavior, and interpersonal relationships in session and offered support and validation of patient's emotional experience. Therapist and patient discussed cognitive restructuring/reframing as well as the importance of practicing assertive interpersonal communication skills with spouse for emotional regulation at this time. Patient denied any current suicidal or homicidal ideation. She further denied any death wishes or auditory/visual hallucinations; oriented to person, place, time, and situation. Patient will continue outpatient psychotherapy as scheduled.     Treatment Plan/Goals: Continue supportive psychotherapy efforts and medications as indicated. Treatment and medication options discussed during today's visit. Patient ackowledged and verbally consented to continue with current treatment plan and was educated on the importance of compliance with treatment and follow-up appointments. Patient seems reasonably able to adhere to treatment plan.      Assisted Patient in processing above session content; acknowledged and normalized patient’s thoughts, feelings, and concerns.  Rationalized patient thought process regarding symptoms and daily life stressors.      Allowed Patient to freely discuss issues  without interruption or judgement with unconditional positive regard, active listening skills, and  empathy. Therapist provided a safe, confidential environment to facilitate the development of a positive therapeutic relationship and encouraged open, honest communication. Assisted Patient in processing session content; acknowledged and normalized Patient’s thoughts, feelings, and concerns by utilizing a person-centered approach in efforts to build appropriate rapport and a positive therapeutic relationship with open and honest communication.      Quality Measures:     TOBACCO USE:  Never smoker    Follow Up:   Return in about 2 weeks (around 5/29/2025) for Psychotherapy Follow-Up.      Joann Raphael LCSW

## 2025-05-27 ENCOUNTER — OFFICE VISIT (OUTPATIENT)
Dept: FAMILY MEDICINE CLINIC | Facility: CLINIC | Age: 55
End: 2025-05-27
Payer: COMMERCIAL

## 2025-05-27 VITALS
TEMPERATURE: 97.5 F | SYSTOLIC BLOOD PRESSURE: 112 MMHG | WEIGHT: 175 LBS | HEIGHT: 70 IN | DIASTOLIC BLOOD PRESSURE: 70 MMHG | HEART RATE: 70 BPM | BODY MASS INDEX: 25.05 KG/M2 | OXYGEN SATURATION: 98 % | RESPIRATION RATE: 16 BRPM

## 2025-05-27 DIAGNOSIS — R05.1 ACUTE COUGH: ICD-10-CM

## 2025-05-27 DIAGNOSIS — J01.00 ACUTE NON-RECURRENT MAXILLARY SINUSITIS: Primary | ICD-10-CM

## 2025-05-27 PROCEDURE — 99213 OFFICE O/P EST LOW 20 MIN: CPT | Performed by: NURSE PRACTITIONER

## 2025-05-27 RX ORDER — DEXTROMETHORPHAN HYDROBROMIDE AND PROMETHAZINE HYDROCHLORIDE 15; 6.25 MG/5ML; MG/5ML
5 SYRUP ORAL 4 TIMES DAILY PRN
Qty: 100 ML | Refills: 0 | Status: SHIPPED | OUTPATIENT
Start: 2025-05-27 | End: 2025-06-01

## 2025-05-27 RX ORDER — DOXYCYCLINE 100 MG/1
100 CAPSULE ORAL 2 TIMES DAILY
Qty: 20 CAPSULE | Refills: 0 | Status: SHIPPED | OUTPATIENT
Start: 2025-05-27 | End: 2025-06-06

## 2025-05-27 RX ORDER — METHYLPREDNISOLONE 4 MG/1
TABLET ORAL
Qty: 21 TABLET | Refills: 0 | Status: SHIPPED | OUTPATIENT
Start: 2025-05-27

## 2025-05-27 NOTE — PROGRESS NOTES
Date: 2025   Patient Name: Joann Nuñez  : 1970   MRN: [id number]     Chief Complaint:    Chief Complaint   Patient presents with    Illness     Sinusitis, cough started Friday night.       History of Present Illness: Joann Nuñez is a 54 y.o. female who is here today to follow up for HPI    History of Present Illness  The patient presents for evaluation of a sore throat.    She began experiencing symptoms on Friday night, which escalated to a severe sore throat over the weekend. Although the intensity of the sore throat has diminished, she continues to experience congestion, cough, and generalized body aches, which she attributes to the coughing. She also reports rhinorrhea, sneezing, and lacrimation. She describes her current state as feeling like her body has been run over by a truck and her head as if a train is passing through it. She reports no febrile episodes, gastrointestinal symptoms such as nausea, vomiting, or diarrhea. She experienced otalgia last night, which has since subsided. She has not been prescribed antibiotics recently. She has been self-medicating with Allegra-D and Tylenol since yesterday.     Review of Systems:   Review of Systems   HENT:  Positive for sore throat.        I have reviewed the patients family history, social history, past medical history, past surgical history and have updated it as appropriate.     Medications:     Current Outpatient Medications:     desvenlafaxine (Pristiq) 50 MG 24 hr tablet, Take 1 tablet by mouth Daily., Disp: 30 tablet, Rfl: 2    estradiol (Vivelle-Dot) 0.0375 MG/24HR patch, Place 1 patch on the skin as directed by provider 2 (Two) Times a Week., Disp: 8 patch, Rfl: 11    Progesterone (Prometrium) 100 MG capsule, Take 1 capsule by mouth every night at bedtime., Disp: 30 capsule, Rfl: 12    doxycycline (MONODOX) 100 MG capsule, Take 1 capsule by mouth 2 (Two) Times a Day for 10 days., Disp: 20 capsule, Rfl: 0     "methylPREDNISolone (MEDROL) 4 MG dose pack, Take as directed on package instructions., Disp: 21 tablet, Rfl: 0    promethazine-dextromethorphan (PROMETHAZINE-DM) 6.25-15 MG/5ML syrup, Take 5 mL by mouth 4 (Four) Times a Day As Needed for Cough for up to 5 days., Disp: 100 mL, Rfl: 0    Testosterone 10 MG/ACT (2%) gel, Place 5 mg on the skin as directed by provider Daily. (Patient not taking: Reported on 5/27/2025), Disp: 60 g, Rfl: 0    Allergies:   Allergies   Allergen Reactions    Amoxicillin Nausea And Vomiting    Codeine        PHQ-9 Total Score:      Physical Exam:  Vital Signs:   Vitals:    05/27/25 1109   BP: 112/70   Pulse: 70   Resp: 16   Temp: 97.5 °F (36.4 °C)   SpO2: 98%   Weight: 79.4 kg (175 lb)   Height: 177.8 cm (70\")     Body mass index is 25.11 kg/m².   BMI is >= 25 and <30. (Overweight) The following options were offered after discussion;: weight loss educational material (shared in after visit summary)       Physical Exam  Vitals and nursing note reviewed.   Constitutional:       Appearance: She is ill-appearing.   HENT:      Head: Normocephalic and atraumatic.      Right Ear: Tympanic membrane, ear canal and external ear normal.      Left Ear: Tympanic membrane, ear canal and external ear normal.      Nose: Nose normal.      Right Turbinates: Swollen.      Left Turbinates: Swollen.      Mouth/Throat:      Mouth: Mucous membranes are moist.      Pharynx: Pharyngeal swelling and posterior oropharyngeal erythema present. No oropharyngeal exudate.      Tonsils: No tonsillar exudate.   Cardiovascular:      Rate and Rhythm: Normal rate and regular rhythm.   Pulmonary:      Effort: Pulmonary effort is normal.      Breath sounds: Normal breath sounds.   Lymphadenopathy:      Cervical: No cervical adenopathy.   Neurological:      Mental Status: She is alert and oriented to person, place, and time.           Assessment/Plan:   Diagnoses and all orders for this visit:    1. Acute non-recurrent maxillary " sinusitis (Primary)  -     doxycycline (MONODOX) 100 MG capsule; Take 1 capsule by mouth 2 (Two) Times a Day for 10 days.  Dispense: 20 capsule; Refill: 0  -     methylPREDNISolone (MEDROL) 4 MG dose pack; Take as directed on package instructions.  Dispense: 21 tablet; Refill: 0    2. Acute cough  -     promethazine-dextromethorphan (PROMETHAZINE-DM) 6.25-15 MG/5ML syrup; Take 5 mL by mouth 4 (Four) Times a Day As Needed for Cough for up to 5 days.  Dispense: 100 mL; Refill: 0         Assessment & Plan  1. Sore throat.  - Reports sore throat starting on 05/23/2025, with improvement but still present.  - Examination revealed redness in the throat.  - Advised to take doxycycline 100 mg twice daily for 10 days with food to avoid gastrointestinal upset; probiotics recommended.  - Prescribed a 6-day course of steroids to manage inflammation.    2. Congestion and cough.  - Reports congestion, runny nose, and cough causing lower back and rib pain.  - Examination revealed no fever, nausea, vomiting, or diarrhea.  - Advised to continue using Allegra-D and Tylenol as needed for symptom relief.  - Prescribed a cough suppressant that may cause drowsiness.    Patient or patient representative verbalized consent for the use of Ambient Listening during the visit with  JUAN ANTONIO Munguia for chart documentation. 5/29/2025  11:47 EDT      Follow Up:   Return if symptoms worsen or fail to improve.      Mini Ballard. JUAN ANTONIO   [facility]

## 2025-06-12 ENCOUNTER — OFFICE VISIT (OUTPATIENT)
Dept: BEHAVIORAL HEALTH | Facility: CLINIC | Age: 55
End: 2025-06-12
Payer: COMMERCIAL

## 2025-06-12 DIAGNOSIS — F43.23 ADJUSTMENT DISORDER WITH MIXED ANXIETY AND DEPRESSED MOOD: Primary | ICD-10-CM

## 2025-06-12 NOTE — PROGRESS NOTES
[facility]                  Follow Up Adult      Follow Up Adult Note     Date:2025   Patient Name: Joann Nuñez  : 1970   MRN: [id number]   Time IN: 4:15 PM    Time OUT: 5:00 PM     Referring Provider: Pasha Acosta MD    Chief Complaint:      ICD-10-CM ICD-9-CM   1. Adjustment disorder with mixed anxiety and depressed mood  F43.23 309.28      History of Present Illness:   Joann Nuñez is a 54 y.o. female who is being seen today for follow up individual Psychotherapy session.      Subjective     Patient's Support Network Includes:  , children, and extended family    Functional Status: Mild impairment     Progress toward goal: Not at goal    Prognosis: Good with Ongoing Treatment     Medications:     Current Outpatient Medications:     desvenlafaxine (Pristiq) 50 MG 24 hr tablet, Take 1 tablet by mouth Daily., Disp: 30 tablet, Rfl: 2    estradiol (Vivelle-Dot) 0.0375 MG/24HR patch, Place 1 patch on the skin as directed by provider 2 (Two) Times a Week., Disp: 8 patch, Rfl: 11    methylPREDNISolone (MEDROL) 4 MG dose pack, Take as directed on package instructions., Disp: 21 tablet, Rfl: 0    Progesterone (Prometrium) 100 MG capsule, Take 1 capsule by mouth every night at bedtime., Disp: 30 capsule, Rfl: 12    Testosterone 10 MG/ACT (2%) gel, Place 5 mg on the skin as directed by provider Daily. (Patient not taking: Reported on 2025), Disp: 60 g, Rfl: 0    Allergies:   Allergies   Allergen Reactions    Amoxicillin Nausea And Vomiting    Codeine        Objective     Mental Status Exam:   Hygiene:   good  Cooperation:  Cooperative  Eye Contact:  Good  Psychomotor Behavior:  Appropriate  Affect:  Full range  Mood: anxious  Speech:  Normal  Thought Process:  Goal directed and Linear  Thought Content:  Mood congruent  Suicidal:  None  Homicidal:  None  Hallucinations:  None  Delusion:  None  Memory:  Intact  Orientation:   Person, Place, Time, and Situation  Reliability:  good  Insight: Good  Judgement:  Good  Impulse Control:  Good  Physical/Medical Issues:  None reported at this time     Assessment / Plan      Visit Diagnosis/Orders Placed This Visit:    ICD-10-CM ICD-9-CM   1. Adjustment disorder with mixed anxiety and depressed mood  F43.23 309.28      PLAN:  Safety: No acute safety concerns  Risk Assessment: Risk of self-harm acutely is low. Risk of self-harm chronically is also low, but could be further elevated in the event of treatment noncompliance and/or AODA.    Patient met with the undersigned on this day in office for individual psychotherapy session. Therapist facilitated patient exploration of the impact of symptoms and daily life stressors upon current thoughts, feelings, behavior, and interpersonal relationships in session and offered support and validation of patient's emotional experience. Therapist and patient collaboratively established patient's treatment goals moving forward and discussed cognitive restructuring/reframing for emotional regulation at this time. Patient denied any current suicidal or homicidal ideation. She further denied any death wishes or auditory/visual hallucinations; oriented to person, place, time, and situation. Patient will continue outpatient psychotherapy as scheduled.     Treatment Plan/Goals: Continue supportive psychotherapy efforts and medications as indicated. Treatment and medication options discussed during today's visit. Patient ackowledged and verbally consented to continue with current treatment plan and was educated on the importance of compliance with treatment and follow-up appointments. Patient seems reasonably able to adhere to treatment plan.      Assisted Patient in processing above session content; acknowledged and normalized patient’s thoughts, feelings, and concerns.  Rationalized patient thought process regarding symptoms, daily life stressors, and treatment plan.       Allowed Patient to freely discuss issues  without interruption or judgement with unconditional positive regard, active listening skills, and empathy. Therapist provided a safe, confidential environment to facilitate the development of a positive therapeutic relationship and encouraged open, honest communication.  Assisted Patient in processing session content; acknowledged and normalized Patient’s thoughts, feelings, and concerns by utilizing a person-centered approach in efforts to build appropriate rapport and a positive therapeutic relationship with open and honest communication.      Quality Measures:     TOBACCO USE:  Never smoker    Follow Up:   Return in about 2 weeks (around 6/26/2025) for Psychotherapy Follow-Up.      Joann Raphael LCSW

## 2025-06-12 NOTE — TREATMENT PLAN
Multi-Disciplinary Problems (from Behavioral Health Treatment Plan)      Active Problems       Problem: Assessment/EAP  Start Date: 06/12/25      Problem Details: Patient will attend outpatient psychotherapy and medication management follow-up appointments as scheduled and take medication as directed.          Goal Priority Start Date Expected End Date End Date    Patient will utilize appropriate treatment services. High 06/12/25 12/11/25 --    Goal Details: Progress toward goal: Initial treatment plan        Goal Intervention Frequency Start Date End Date    Assist patient in developing a plan of action Monthly 06/12/25 --    Intervention Details: Duration of treatment: Until remission of symptoms                Problem: Adjustment  Start Date: 06/12/25      Problem Details: The patient self-scales this problem as a 5 out of 10; with 10 being the worst. Patient identified her most concerning symptoms as: difficulty navigating interpersonal tension with spouse, guilt, and stress related to caregiving.           Goal Priority Start Date Expected End Date End Date    Patient will implement healthy coping strategies. Medium 06/12/25 12/11/25 --    Goal Details: Progress toward goal: Initial treatment plan        Goal Intervention Frequency Start Date End Date    Assist patient to identify and develop healthy coping strategies Monthly 06/12/25 --    Intervention Details: Duration of treatment: Until remission of symptoms                        Reviewed By       Joann Raphael LCSW 06/12/25 0894                   Therapist reviewed and discussed treatment plan with patient in session.

## 2025-06-23 LAB
NCCN CRITERIA FLAG: NORMAL
TYRER CUZICK SCORE: 11.2

## 2025-06-24 ENCOUNTER — HOSPITAL ENCOUNTER (OUTPATIENT)
Dept: MAMMOGRAPHY | Facility: HOSPITAL | Age: 55
Discharge: HOME | End: 2025-06-24
Admitting: FAMILY MEDICINE
Payer: COMMERCIAL

## 2025-06-24 DIAGNOSIS — Z12.31 SCREENING MAMMOGRAM FOR BREAST CANCER: ICD-10-CM

## 2025-06-24 PROCEDURE — 77063 BREAST TOMOSYNTHESIS BI: CPT

## 2025-06-24 PROCEDURE — 77067 SCR MAMMO BI INCL CAD: CPT

## 2025-06-25 ENCOUNTER — OFFICE VISIT (OUTPATIENT)
Dept: BEHAVIORAL HEALTH | Facility: CLINIC | Age: 55
End: 2025-06-25
Payer: COMMERCIAL

## 2025-06-25 VITALS
HEART RATE: 68 BPM | HEIGHT: 70 IN | WEIGHT: 175 LBS | BODY MASS INDEX: 25.05 KG/M2 | DIASTOLIC BLOOD PRESSURE: 90 MMHG | SYSTOLIC BLOOD PRESSURE: 138 MMHG

## 2025-06-25 DIAGNOSIS — F43.23 ADJUSTMENT DISORDER WITH MIXED ANXIETY AND DEPRESSED MOOD: Primary | ICD-10-CM

## 2025-06-25 RX ORDER — DESVENLAFAXINE 100 MG/1
100 TABLET, EXTENDED RELEASE ORAL DAILY
Qty: 30 TABLET | Refills: 2 | Status: SHIPPED | OUTPATIENT
Start: 2025-06-25
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
## 2025-07-03 ENCOUNTER — HOSPITAL ENCOUNTER (OUTPATIENT)
Dept: ULTRASOUND IMAGING | Facility: HOSPITAL | Age: 55
Discharge: HOME | End: 2025-07-03
Payer: COMMERCIAL

## 2025-07-03 ENCOUNTER — HOSPITAL ENCOUNTER (OUTPATIENT)
Dept: MAMMOGRAPHY | Facility: HOSPITAL | Age: 55
Discharge: HOME | End: 2025-07-03
Payer: COMMERCIAL

## 2025-07-03 DIAGNOSIS — R92.8 ABNORMAL MAMMOGRAM: ICD-10-CM

## 2025-07-03 PROCEDURE — 76642 ULTRASOUND BREAST LIMITED: CPT

## 2025-07-03 PROCEDURE — G0279 TOMOSYNTHESIS, MAMMO: HCPCS

## 2025-07-03 PROCEDURE — 77065 DX MAMMO INCL CAD UNI: CPT

## 2025-07-07 ENCOUNTER — OFFICE VISIT (OUTPATIENT)
Dept: OBSTETRICS AND GYNECOLOGY | Facility: CLINIC | Age: 55
End: 2025-07-07
Payer: COMMERCIAL

## 2025-07-07 VITALS
WEIGHT: 176.6 LBS | BODY MASS INDEX: 25.28 KG/M2 | HEIGHT: 70 IN | DIASTOLIC BLOOD PRESSURE: 82 MMHG | SYSTOLIC BLOOD PRESSURE: 124 MMHG

## 2025-07-07 DIAGNOSIS — N95.1 MENOPAUSAL SYNDROME: ICD-10-CM

## 2025-07-07 DIAGNOSIS — R68.82 DECREASED LIBIDO: Primary | ICD-10-CM

## 2025-07-07 PROCEDURE — 99213 OFFICE O/P EST LOW 20 MIN: CPT | Performed by: NURSE PRACTITIONER

## 2025-07-07 RX ORDER — TESTOSTERONE 10 MG/.5G
5 GEL, METERED TOPICAL DAILY
Qty: 60 G | Refills: 0 | Status: SHIPPED | OUTPATIENT
Start: 2025-07-07 | End: 2025-08-06

## 2025-07-07 NOTE — PROGRESS NOTES
Chief Complaint   Patient presents with    Gynecologic Exam       Subjective   HPI  Joann Nuñez is a 54 y.o. female, . Her last LMP was Patient's last menstrual period was 10/01/2020 (approximate).  Who presents for follow up on menopausal symptoms and starting testosterone.       At her last visit she was treated with HRT. She is currently taking: Vivelle dot patch, prometrium, and compounded testosterone. Since then she reports her symptoms/issue has improved. The patient reports additional symptoms as none.        Additional OB/GYN History     Last Pap : 2025 Negative, HPV-  Last Completed Pap Smear            Upcoming       PAP SMEAR (Every 3 Years) Next due on 2025  LIQUID-BASED PAP SMEAR WITH HPV GENOTYPING REGARDLESS OF INTERPRETATION ([facility])    10/18/2022  LIQUID-BASED PAP SMEAR, [facility] ([facility])    2021  Liquid-based Pap Smear, Screening    2020  Done    10/17/2016  Done     Only the first 5 history entries have been loaded, but more history exists.                          Last mammogram: 2025 Done at . Report in chart  Last Completed Mammogram            Upcoming       MAMMOGRAM (Every 2 Years) Next due on 7/3/2027      2025  Order placed for Mammo Diagnostic Digital Tomosynthesis Right With CAD by Laine Collier RegSched Rep    2025  Mammo Diagnostic Digital Tomosynthesis Right With CAD    2025  Mammo Screening Digital Tomosynthesis Bilateral With CAD    2022  Mammo Screening Digital Tomosynthesis Bilateral With CAD    2021  Mammo Screening Digital Tomosynthesis Bilateral With CAD      Only the first 5 history entries have been loaded, but more history exists.                            Tobacco Usage?: No   OB History          2    Para   2    Term   2            AB        Living             SAB        IAB        Ectopic        Molar        Multiple        Live Births  "  2                  Current Outpatient Medications:     Testosterone 10 MG/ACT (2%) gel, Place 5 mg on the skin as directed by provider Daily for 30 days., Disp: 60 g, Rfl: 0    desvenlafaxine (Pristiq) 100 MG 24 hr tablet, Take 1 tablet by mouth Daily., Disp: 30 tablet, Rfl: 2    estradiol (Vivelle-Dot) 0.0375 MG/24HR patch, Place 1 patch on the skin as directed by provider 2 (Two) Times a Week., Disp: 8 patch, Rfl: 11    Progesterone (Prometrium) 100 MG capsule, Take 1 capsule by mouth every night at bedtime., Disp: 30 capsule, Rfl: 12     Past Medical History:   Diagnosis Date    Allergic     Migraine     Multiple gestation 89        Past Surgical History:   Procedure Laterality Date     SECTION      x2       The additional following portions of the patient's history were reviewed and updated as appropriate: allergies, current medications, past family history, past medical history, past social history, past surgical history, and problem list.    Review of Systems   Constitutional: Negative.    Respiratory: Negative.     Cardiovascular: Negative.    Gastrointestinal: Negative.    Endocrine: Negative.    Genitourinary: Negative.    Psychiatric/Behavioral: Negative.         I have reviewed and agree with the HPI, ROS, and historical information as entered above. Moon Washington, APRN      Objective   /82   Ht 177.8 cm (70\")   Wt 80.1 kg (176 lb 9.6 oz)   LMP 10/01/2020 (Approximate)   BMI 25.34 kg/m²     Physical Exam  Constitutional:       Appearance: Normal appearance.   Pulmonary:      Effort: Pulmonary effort is normal.   Neurological:      Mental Status: She is alert.         Assessment & Plan     Assessment     Problem List Items Addressed This Visit          Genitourinary and Reproductive     Menopausal syndrome          Overview    The patient has multiple complaints today including radiating hip pain, weight gain, fatigue, hot flashes, night sweats, depressed mood. LMP 2020 and " this has been ongoing since around that time. Will check labs, PCP has recently started her on pristiq which she feels is helping some with mood. Labs today, will start HRT to see if she has improvement in symptoms. VTE risk and theoretical breast CA risk reviewed. Improved at F/U but still with decreased libido. Check labs and add testosterone compound.         Relevant Orders    Testosterone    Decreased libido - Primary    Overview   Could be related to pristiq. Will check labs and start compound testosterone. Will F/U 6 weeks for repeat labs. We discussed this is not an FDA approved treatment and is a short term treatment not meant for > 2 years. At F/U she reports improved libido and energy levels, testosterone level repeated.           Relevant Medications    desvenlafaxine (Pristiq) 100 MG 24 hr tablet    Testosterone 10 MG/ACT (2%) gel    Other Relevant Orders    Testosterone       She is happy with there results of testosterone. Will check level today and refill for 30 days, will adjust prn. Will send future refill to Dr. Smith since he can send 6 months at a time.     Plan     Testosterone level  Refill testosterone  Return if symptoms worsen or fail to improve, for Annual physical.        Moon Washington, APRN  07/07/2025

## 2025-07-08 LAB — TESTOST SERPL-MCNC: 6 NG/DL (ref 4–50)
